# Patient Record
Sex: MALE | Race: WHITE | NOT HISPANIC OR LATINO | URBAN - METROPOLITAN AREA
[De-identification: names, ages, dates, MRNs, and addresses within clinical notes are randomized per-mention and may not be internally consistent; named-entity substitution may affect disease eponyms.]

---

## 2023-04-05 LAB
ALBUMIN (G/DL) IN SER/PLAS: 4.5 G/DL (ref 3.4–5)
ANION GAP IN SER/PLAS: 12 MMOL/L (ref 10–20)
CALCIUM (MG/DL) IN SER/PLAS: 10 MG/DL (ref 8.6–10.6)
CARBON DIOXIDE, TOTAL (MMOL/L) IN SER/PLAS: 31 MMOL/L (ref 21–32)
CHLORIDE (MMOL/L) IN SER/PLAS: 103 MMOL/L (ref 98–107)
CREATININE (MG/DL) IN SER/PLAS: 0.72 MG/DL (ref 0.5–1.3)
ERYTHROCYTE DISTRIBUTION WIDTH (RATIO) BY AUTOMATED COUNT: 13.4 % (ref 11.5–14.5)
ERYTHROCYTE MEAN CORPUSCULAR HEMOGLOBIN CONCENTRATION (G/DL) BY AUTOMATED: 34.1 G/DL (ref 32–36)
ERYTHROCYTE MEAN CORPUSCULAR VOLUME (FL) BY AUTOMATED COUNT: 86 FL (ref 80–100)
ERYTHROCYTES (10*6/UL) IN BLOOD BY AUTOMATED COUNT: 5.35 X10E12/L (ref 4.5–5.9)
GFR MALE: >90 ML/MIN/1.73M2
GLUCOSE (MG/DL) IN SER/PLAS: 88 MG/DL (ref 74–99)
HEMATOCRIT (%) IN BLOOD BY AUTOMATED COUNT: 46.1 % (ref 41–52)
HEMOGLOBIN (G/DL) IN BLOOD: 15.7 G/DL (ref 13.5–17.5)
LEUKOCYTES (10*3/UL) IN BLOOD BY AUTOMATED COUNT: 6.8 X10E9/L (ref 4.4–11.3)
NRBC (PER 100 WBCS) BY AUTOMATED COUNT: 0 /100 WBC (ref 0–0)
PHOSPHATE (MG/DL) IN SER/PLAS: 3.4 MG/DL (ref 2.5–4.9)
PLATELETS (10*3/UL) IN BLOOD AUTOMATED COUNT: 211 X10E9/L (ref 150–450)
POTASSIUM (MMOL/L) IN SER/PLAS: 4.7 MMOL/L (ref 3.5–5.3)
SODIUM (MMOL/L) IN SER/PLAS: 141 MMOL/L (ref 136–145)
UREA NITROGEN (MG/DL) IN SER/PLAS: 12 MG/DL (ref 6–23)

## 2023-09-15 ENCOUNTER — HOSPITAL ENCOUNTER (OUTPATIENT)
Dept: DATA CONVERSION | Facility: HOSPITAL | Age: 68
Discharge: HOME | End: 2023-09-15

## 2023-09-15 DIAGNOSIS — R13.10 DYSPHAGIA, UNSPECIFIED: ICD-10-CM

## 2023-09-15 DIAGNOSIS — G23.1: ICD-10-CM

## 2023-10-01 DIAGNOSIS — N52.9 ERECTILE DYSFUNCTION, UNSPECIFIED ERECTILE DYSFUNCTION TYPE: Primary | ICD-10-CM

## 2023-10-02 RX ORDER — SILDENAFIL 100 MG/1
TABLET, FILM COATED ORAL
Qty: 10 TABLET | Refills: 0 | Status: SHIPPED | OUTPATIENT
Start: 2023-10-02 | End: 2023-11-06

## 2023-11-01 ENCOUNTER — TELEPHONE (OUTPATIENT)
Dept: PRIMARY CARE | Facility: CLINIC | Age: 68
End: 2023-11-01

## 2023-11-06 DIAGNOSIS — N52.9 ERECTILE DYSFUNCTION, UNSPECIFIED ERECTILE DYSFUNCTION TYPE: ICD-10-CM

## 2023-11-06 RX ORDER — SILDENAFIL 100 MG/1
TABLET, FILM COATED ORAL
Qty: 10 TABLET | Refills: 0 | Status: SHIPPED | OUTPATIENT
Start: 2023-11-06 | End: 2023-12-18

## 2023-11-14 DIAGNOSIS — I10 ESSENTIAL HYPERTENSION: Primary | ICD-10-CM

## 2023-11-15 RX ORDER — METOPROLOL SUCCINATE 100 MG/1
100 TABLET, EXTENDED RELEASE ORAL 2 TIMES DAILY
Qty: 60 TABLET | Refills: 0 | Status: SHIPPED | OUTPATIENT
Start: 2023-11-15 | End: 2023-12-28

## 2023-12-13 DIAGNOSIS — N52.9 ERECTILE DYSFUNCTION, UNSPECIFIED ERECTILE DYSFUNCTION TYPE: ICD-10-CM

## 2023-12-18 RX ORDER — SILDENAFIL 100 MG/1
TABLET, FILM COATED ORAL
Qty: 10 TABLET | Refills: 0 | Status: SHIPPED | OUTPATIENT
Start: 2023-12-18

## 2023-12-22 DIAGNOSIS — I10 ESSENTIAL HYPERTENSION: ICD-10-CM

## 2023-12-28 DIAGNOSIS — I10 ESSENTIAL HYPERTENSION: ICD-10-CM

## 2023-12-28 RX ORDER — METOPROLOL SUCCINATE 100 MG/1
100 TABLET, EXTENDED RELEASE ORAL 2 TIMES DAILY
Qty: 60 TABLET | Refills: 0 | Status: SHIPPED | OUTPATIENT
Start: 2023-12-28 | End: 2023-12-29

## 2023-12-29 RX ORDER — METOPROLOL SUCCINATE 100 MG/1
100 TABLET, EXTENDED RELEASE ORAL 2 TIMES DAILY
Qty: 60 TABLET | Refills: 0 | Status: SHIPPED | OUTPATIENT
Start: 2023-12-29

## 2024-01-04 PROBLEM — K21.9 GERD (GASTROESOPHAGEAL REFLUX DISEASE): Status: ACTIVE | Noted: 2024-01-04

## 2024-01-04 PROBLEM — M48.00 SPINAL STENOSIS: Status: ACTIVE | Noted: 2024-01-04

## 2024-01-04 PROBLEM — N41.9 PROSTATITIS: Status: ACTIVE | Noted: 2024-01-04

## 2024-01-04 PROBLEM — R26.81 GAIT INSTABILITY: Status: ACTIVE | Noted: 2024-01-04

## 2024-01-04 PROBLEM — F33.9 RECURRENT MAJOR DEPRESSIVE DISORDER (CMS-HCC): Status: ACTIVE | Noted: 2024-01-04

## 2024-01-04 PROBLEM — R41.3 MEMORY LOSS: Status: ACTIVE | Noted: 2024-01-04

## 2024-01-04 PROBLEM — M54.50 PAIN, LOW BACK: Status: ACTIVE | Noted: 2024-01-04

## 2024-01-04 PROBLEM — R00.0 TACHYCARDIA: Status: ACTIVE | Noted: 2024-01-04

## 2024-01-04 PROBLEM — H05.20 PROPTOSIS: Status: ACTIVE | Noted: 2024-01-04

## 2024-01-04 PROBLEM — R05.9 COUGH: Status: ACTIVE | Noted: 2024-01-04

## 2024-01-04 PROBLEM — R60.9 EDEMA: Status: ACTIVE | Noted: 2024-01-04

## 2024-01-04 PROBLEM — I10 BENIGN ESSENTIAL HYPERTENSION: Status: ACTIVE | Noted: 2024-01-04

## 2024-01-04 PROBLEM — J02.9 ACUTE INFECTIVE PHARYNGITIS: Status: ACTIVE | Noted: 2024-01-04

## 2024-01-04 PROBLEM — G89.29 CHRONIC PAIN: Status: ACTIVE | Noted: 2024-01-04

## 2024-01-04 PROBLEM — G31.84 MCI (MILD COGNITIVE IMPAIRMENT): Status: ACTIVE | Noted: 2024-01-04

## 2024-01-04 PROBLEM — R31.9 HEMATURIA: Status: ACTIVE | Noted: 2024-01-04

## 2024-01-04 PROBLEM — N52.9 MALE ERECTILE DISORDER OF ORGANIC ORIGIN: Status: ACTIVE | Noted: 2024-01-04

## 2024-01-04 PROBLEM — R42 DIZZINESS: Status: ACTIVE | Noted: 2024-01-04

## 2024-01-04 PROBLEM — J18.9 PNEUMONIA: Status: ACTIVE | Noted: 2024-01-04

## 2024-01-04 PROBLEM — H53.2 DOUBLE VISION: Status: ACTIVE | Noted: 2024-01-04

## 2024-01-04 PROBLEM — D47.2 MONOCLONAL GAMMOPATHY: Status: ACTIVE | Noted: 2024-01-04

## 2024-01-04 RX ORDER — ASCORBIC ACID 500 MG
3 TABLET ORAL 2 TIMES DAILY
COMMUNITY
Start: 2017-06-22

## 2024-01-04 RX ORDER — NAPROXEN SODIUM 220 MG
1 TABLET ORAL 2 TIMES DAILY PRN
COMMUNITY
Start: 2017-06-22

## 2024-01-04 RX ORDER — RAMIPRIL 10 MG/1
20 CAPSULE ORAL DAILY
COMMUNITY

## 2024-01-04 RX ORDER — OXYCODONE AND ACETAMINOPHEN 7.5; 325 MG/1; MG/1
TABLET ORAL
COMMUNITY
Start: 2013-02-26

## 2024-01-04 RX ORDER — PREDNISONE 10 MG/1
TABLET ORAL
COMMUNITY
Start: 2023-06-29

## 2024-01-04 RX ORDER — AMLODIPINE BESYLATE 10 MG/1
10 TABLET ORAL DAILY
COMMUNITY
Start: 2023-08-30

## 2024-01-04 RX ORDER — AMLODIPINE BESYLATE 5 MG/1
1 TABLET ORAL DAILY
COMMUNITY
Start: 2012-06-15

## 2024-01-04 RX ORDER — OXYCODONE AND ACETAMINOPHEN 10; 325 MG/1; MG/1
TABLET ORAL
COMMUNITY
Start: 2023-11-17

## 2024-01-04 RX ORDER — LORAZEPAM 1 MG/1
TABLET ORAL
COMMUNITY
Start: 2023-07-07

## 2024-01-04 RX ORDER — METOPROLOL SUCCINATE 100 MG/1
1 TABLET, EXTENDED RELEASE ORAL 2 TIMES DAILY
COMMUNITY
Start: 2013-02-27

## 2024-01-04 RX ORDER — SILDENAFIL 100 MG/1
TABLET, FILM COATED ORAL
COMMUNITY
Start: 2015-02-23

## 2024-01-04 RX ORDER — GABAPENTIN 100 MG/1
CAPSULE ORAL
COMMUNITY

## 2024-01-04 RX ORDER — CHOLECALCIFEROL (VITAMIN D3) 25 MCG
1 TABLET ORAL DAILY
COMMUNITY
Start: 2017-06-22

## 2024-01-04 RX ORDER — DULOXETIN HYDROCHLORIDE 20 MG/1
CAPSULE, DELAYED RELEASE ORAL
COMMUNITY
Start: 2023-09-06 | End: 2024-01-10 | Stop reason: ALTCHOICE

## 2024-01-10 ENCOUNTER — OFFICE VISIT (OUTPATIENT)
Dept: NEUROLOGY | Facility: CLINIC | Age: 69
End: 2024-01-10
Payer: MEDICARE

## 2024-01-10 VITALS
HEART RATE: 98 BPM | WEIGHT: 169.5 LBS | RESPIRATION RATE: 18 BRPM | DIASTOLIC BLOOD PRESSURE: 99 MMHG | BODY MASS INDEX: 25.77 KG/M2 | TEMPERATURE: 97.8 F | SYSTOLIC BLOOD PRESSURE: 156 MMHG

## 2024-01-10 DIAGNOSIS — G31.84 MCI (MILD COGNITIVE IMPAIRMENT): ICD-10-CM

## 2024-01-10 DIAGNOSIS — G23.1 PROGRESSIVE SUPRANUCLEAR PALSY (MULTI): Primary | ICD-10-CM

## 2024-01-10 DIAGNOSIS — F32.A DEPRESSION, UNSPECIFIED DEPRESSION TYPE: ICD-10-CM

## 2024-01-10 PROCEDURE — 3077F SYST BP >= 140 MM HG: CPT | Performed by: PSYCHIATRY & NEUROLOGY

## 2024-01-10 PROCEDURE — 1126F AMNT PAIN NOTED NONE PRSNT: CPT | Performed by: PSYCHIATRY & NEUROLOGY

## 2024-01-10 PROCEDURE — 99214 OFFICE O/P EST MOD 30 MIN: CPT | Performed by: PSYCHIATRY & NEUROLOGY

## 2024-01-10 PROCEDURE — 3080F DIAST BP >= 90 MM HG: CPT | Performed by: PSYCHIATRY & NEUROLOGY

## 2024-01-10 PROCEDURE — 1159F MED LIST DOCD IN RCRD: CPT | Performed by: PSYCHIATRY & NEUROLOGY

## 2024-01-10 PROCEDURE — 1036F TOBACCO NON-USER: CPT | Performed by: PSYCHIATRY & NEUROLOGY

## 2024-01-10 RX ORDER — SERTRALINE HYDROCHLORIDE 50 MG/1
TABLET, FILM COATED ORAL
Qty: 90 TABLET | Refills: 3 | Status: SHIPPED | OUTPATIENT
Start: 2024-01-10

## 2024-01-10 ASSESSMENT — PATIENT HEALTH QUESTIONNAIRE - PHQ9
SUM OF ALL RESPONSES TO PHQ9 QUESTIONS 1 AND 2: 0
1. LITTLE INTEREST OR PLEASURE IN DOING THINGS: NOT AT ALL
2. FEELING DOWN, DEPRESSED OR HOPELESS: NOT AT ALL

## 2024-01-10 ASSESSMENT — ENCOUNTER SYMPTOMS
LOSS OF SENSATION IN FEET: 0
DEPRESSION: 0
OCCASIONAL FEELINGS OF UNSTEADINESS: 1

## 2024-01-10 ASSESSMENT — PAIN SCALES - GENERAL: PAINLEVEL: 0-NO PAIN

## 2024-01-10 NOTE — PROGRESS NOTES
Start Time:4:15pm  Stop Time:4:45pm    Chart reviewed, including physician notes and diagnostic studies, for 3 minutes prior to this appointment.      Accompanied by: wife    Following the patient's last visit, they were to get a swallowing evaluation, start duloxetine, and discuss possible prisms with optometrist.  They were contacted for participation in the ALLFTD study, but its been difficult finding a date.  Mood is depressed.  No passive death wish or suicidal ideation.  Sleeping well.  Appetite is good.  He has more speech apraxia.  He has fallen three times since I last saw him.  He falls backwards in a plane and he has not had injuries.  His wife notices more confusion and perseveration.  He would like to discontinue duloxetine as it makes him tired.    Allergies   Allergen Reactions    Nsaids (Non-Steroidal Anti-Inflammatory Drug) Unknown    Pentazocine Unknown         Current Outpatient Medications:     amLODIPine (Norvasc) 10 mg tablet, Take 1 tablet (10 mg) by mouth once daily., Disp: , Rfl:     ascorbic acid (Vitamin C) 500 mg tablet, Take 3 tablets (1,500 mg) by mouth 2 times a day., Disp: , Rfl:     cholecalciferol (Vitamin D-3) 25 MCG (1000 UT) tablet, Take 1 tablet (25 mcg) by mouth once daily., Disp: , Rfl:     gabapentin (Neurontin) 100 mg capsule, TAKE 1 TO 2 CAPSULES BY MOUTH FOUR TIMES A DAY, Disp: , Rfl:     LORazepam (Ativan) 1 mg tablet, TAKE 1 TABLET BY MOUTH 15 minutes before MRI, Disp: , Rfl:     metoprolol succinate XL (Toprol-XL) 100 mg 24 hr tablet, TAKE 1 TABLET BY MOUTH 2 TIMES A DAY, Disp: 60 tablet, Rfl: 0    oxyCODONE-acetaminophen (Percocet)  mg tablet, TAKE 1 TABLET BY MOUTH 5 TIMES A DAY AS NEEDED, Disp: , Rfl:     predniSONE (Deltasone) 10 mg tablet, TAKE 4 TABLETS BY MOUTH DAILY FOR 2 DAYS, THEN 3 TABLETS DAILY FOR 2 DAYS, THEN 2 TABLETS DAILY FOR 2 DAYS, THEN STOP, Disp: , Rfl:     ramipril (Altace) 10 mg capsule, Take 2 capsules (20 mg) by mouth once daily., Disp: ,  "Rfl:     sildenafil (Viagra) 100 mg tablet, TAKE 1 TABLET BY MOUTH PER DAY 1 HOUR BEFORE NEEDED, Disp: 10 tablet, Rfl: 0    sildenafil (Viagra) 100 mg tablet, Take by mouth., Disp: , Rfl:     amLODIPine (Norvasc) 5 mg tablet, Take 1 tablet (5 mg) by mouth once daily., Disp: , Rfl:     metoprolol succinate XL (Toprol-XL) 100 mg 24 hr tablet, Take 1 tablet (100 mg) by mouth 2 times a day., Disp: , Rfl:     naproxen sodium (Aleve) 220 mg tablet, Take 1 tablet (220 mg) by mouth 2 times a day as needed., Disp: , Rfl:     oxyCODONE-acetaminophen (Percocet) 7.5-325 mg tablet, Take by mouth., Disp: , Rfl:     sertraline (Zoloft) 50 mg tablet, Take 1/2 tab PO daily for two weeks, then increase to one pill by mouth daily, Disp: 90 tablet, Rfl: 3    Review of Systems  As per HPI, otherwise all other systems have been reviewed are negative for complaint.      Objective   BP (!) 156/99   Pulse 98   Temp 36.6 °C (97.8 °F) (Tympanic)   Resp 18   Wt 76.9 kg (169 lb 8 oz)   BMI 25.77 kg/m²     EXAMINATION  Mental Status Examination  General appearance: well kept, good eye contact, cooperative  Orientation: alert and oriented to place and person  Motor: significant, gait is parkinsonian  Speech:  somewhat apraxic  Mood: depressed  Affect: Constricted and depressed  Passive death wish: No  Suicidal ideation: No  Thought process:  bradyphrenic  Thought content: Hallucinations:no, Delusions: none, denied; and not responding to internal stimuli  Insight/Judgment: Fair/Fair  Fund of knowledge: Good  Recent and remote memory: Fair/Good  Attention span and concentration: Poor  Language: apraxia of speech    Reg: 3/3    Season: fall x  Year: ?    President: ?    State: [place]  City: [place]  Place:   Floor: [facility]  Street: [facility]    Recall: 2/3 (3/3 with cues)    MoCA (3/01/2023): 16/30  \"f\"=1 word  MIS: 5/15     MoCA (6/22/2017): 19+1 = 20/30 (- 2 visuospatial, - 2 subtraction, - 1 fluency, -1 abstraction, - 5 recall)  Phonemic: 10 " animals: 16  recall: 0/5 --> cc 3/5, mc 2/2  MIS 8/15      NEUROLOGICAL EXAMINATION  Slowed horizontal saccades and restricted vertical gaze    Motor:  Muscular tone: abnormal bilateral cogwheel rigidity primarily in bilateral wrists and axial rigidity  Movements: normal    Gait:  Able to stand from seated position with arms across chest: abnormal requires assistance  parkinsonian    Negative applause sign    Formulation: Mr. Lopez is a very-pleasant 68 y.o. year old, ,  male with a past personal history of progressive supranuclear palsy and depression who is presenting today for a follow-up visit.  He is significantly more depressed and bradyphrenic.    Diagnosis:  Progressive supranuclear palsy  Depression, not otherwise specified, moderate severity      Plan:  - if you are on two pills of Cymbalta, go down to one pill for two weeks, then stop it  - start sertraline (Zoloft) 50mg, take 1/2 pill by mouth daily for two weeks, then increase to one pill by mouth daily  - if mood no better when you are on the whole pill for one month, then call me and we will likely increase it ([phone])  - continue other medications  - follow-up with me in about 3 months with repeat MoCA

## 2024-01-10 NOTE — PATIENT INSTRUCTIONS
- if you are on two pills of Cymbalta, go down to one pill for two weeks, then stop it  - start sertraline (Zoloft) 50mg, take 1/2 pill by mouth daily for two weeks, then increase to one pill by mouth daily  - if mood no better when you are on the whole pill for one month, then call me and we will likely increase it ([phone])  - continue other medications  - follow-up with me in about 3 months with repeat MoCA

## 2024-01-17 ENCOUNTER — OFFICE VISIT (OUTPATIENT)
Dept: PRIMARY CARE | Facility: CLINIC | Age: 69
End: 2024-01-17
Payer: MEDICARE

## 2024-01-17 VITALS
OXYGEN SATURATION: 95 % | WEIGHT: 169.53 LBS | HEART RATE: 97 BPM | DIASTOLIC BLOOD PRESSURE: 90 MMHG | BODY MASS INDEX: 25.69 KG/M2 | RESPIRATION RATE: 16 BRPM | SYSTOLIC BLOOD PRESSURE: 150 MMHG | HEIGHT: 68 IN

## 2024-01-17 DIAGNOSIS — R09.82 POST-NASAL DRIP: Primary | ICD-10-CM

## 2024-01-17 PROCEDURE — 3077F SYST BP >= 140 MM HG: CPT | Performed by: INTERNAL MEDICINE

## 2024-01-17 PROCEDURE — 1126F AMNT PAIN NOTED NONE PRSNT: CPT | Performed by: INTERNAL MEDICINE

## 2024-01-17 PROCEDURE — 1159F MED LIST DOCD IN RCRD: CPT | Performed by: INTERNAL MEDICINE

## 2024-01-17 PROCEDURE — 1036F TOBACCO NON-USER: CPT | Performed by: INTERNAL MEDICINE

## 2024-01-17 PROCEDURE — 3080F DIAST BP >= 90 MM HG: CPT | Performed by: INTERNAL MEDICINE

## 2024-01-17 PROCEDURE — 99213 OFFICE O/P EST LOW 20 MIN: CPT | Performed by: INTERNAL MEDICINE

## 2024-01-17 NOTE — PROGRESS NOTES
"68-year-old male with progressive supranuclear palsy depression followed by neurology here for evaluation of cough.  For the past week he has had a cough, it only comes when he lays flat, no cough during the day.  No chest pain shortness of breath.  No fevers no chills.  He does have chronic allergies but no sinus pain no sore throat.  No headache.  Unfortunately he is supranuclear palsy is progressing, his gait is getting worse, his memory is getting worse, his speech is getting worse.  He is looking into various studies.  Lives at home with wife.    Appetite intact.  No nausea vomit diarrhea.  No weight changes.    Blood pressure 150/90, pulse 97, resp. rate 16, height 1.727 m (5' 8\"), weight 76.9 kg (169 lb 8.5 oz), SpO2 95 %.   Repeat blood pressure is 142/80.  Calm coherent and appropriate    Neck is supple and none tender    Breathing comfortably, clear to auscultation bilaterally    Regular rate rhythm, no murmur gallops or rubs.  Good distal pulses.  No edema.  No JVD.    Abdomen soft and nontender, normal bowel sounds.    Extremities warm well perfused with no clubbing or cyanosis    Cognition intact.    Assessment plan: Karl is a pleasant 60-year-old male with progressive supranuclear palsy depression here for relation of cough consistent with postnasal drip, trial of Flonase twice a day for 5 days.  Call if symptoms not resolved by next week.    Please see my last note for details of other medical issues.  "

## 2024-01-24 DIAGNOSIS — N52.9 ERECTILE DYSFUNCTION, UNSPECIFIED ERECTILE DYSFUNCTION TYPE: Primary | ICD-10-CM

## 2024-01-26 RX ORDER — SILDENAFIL 100 MG/1
100 TABLET, FILM COATED ORAL ONCE AS NEEDED
Qty: 30 TABLET | Refills: 2 | Status: SHIPPED | OUTPATIENT
Start: 2024-01-26

## 2024-03-01 ENCOUNTER — TELEPHONE (OUTPATIENT)
Dept: PRIMARY CARE | Facility: CLINIC | Age: 69
End: 2024-03-01
Payer: MEDICARE

## 2024-03-01 DIAGNOSIS — R26.81 GAIT INSTABILITY: Primary | ICD-10-CM

## 2024-06-19 ENCOUNTER — APPOINTMENT (OUTPATIENT)
Dept: NEUROLOGY | Facility: CLINIC | Age: 69
End: 2024-06-19
Payer: MEDICARE

## 2024-06-19 ENCOUNTER — CLINICAL SUPPORT (OUTPATIENT)
Dept: GERIATRIC MEDICINE | Facility: CLINIC | Age: 69
End: 2024-06-19
Payer: MEDICARE

## 2024-06-19 ENCOUNTER — OFFICE VISIT (OUTPATIENT)
Dept: NEUROLOGY | Facility: CLINIC | Age: 69
End: 2024-06-19
Payer: MEDICARE

## 2024-06-19 VITALS
TEMPERATURE: 97.8 F | HEART RATE: 98 BPM | DIASTOLIC BLOOD PRESSURE: 99 MMHG | RESPIRATION RATE: 18 BRPM | SYSTOLIC BLOOD PRESSURE: 168 MMHG

## 2024-06-19 DIAGNOSIS — G23.1 PROGRESSIVE SUPRANUCLEAR PALSY (MULTI): Primary | ICD-10-CM

## 2024-06-19 DIAGNOSIS — F32.A DEPRESSION, UNSPECIFIED DEPRESSION TYPE: ICD-10-CM

## 2024-06-19 DIAGNOSIS — G31.84 MCI (MILD COGNITIVE IMPAIRMENT): ICD-10-CM

## 2024-06-19 PROCEDURE — 3080F DIAST BP >= 90 MM HG: CPT | Performed by: PSYCHIATRY & NEUROLOGY

## 2024-06-19 PROCEDURE — 99214 OFFICE O/P EST MOD 30 MIN: CPT | Performed by: PSYCHIATRY & NEUROLOGY

## 2024-06-19 PROCEDURE — 3077F SYST BP >= 140 MM HG: CPT | Performed by: PSYCHIATRY & NEUROLOGY

## 2024-06-19 RX ORDER — SERTRALINE HYDROCHLORIDE 50 MG/1
75 TABLET, FILM COATED ORAL DAILY
Qty: 135 TABLET | Refills: 3 | Status: SHIPPED | OUTPATIENT
Start: 2024-06-19 | End: 2025-06-19

## 2024-06-19 ASSESSMENT — MONTREAL COGNITIVE ASSESSMENT (MOCA)
12. MEMORY INDEX SCORE: 1
4. NAME EACH OF THE THREE ANIMALS SHOWN: 0
VISUOSPATIAL/EXECUTIVE SUBSCORE: 0
WHAT IS THE TOTAL SCORE (OUT OF 30): 10
5. MEMORY TRIALS: 0
11. FOR EACH PAIR OF WORDS, WHAT CATEGORY DO THEY BELONG TO (OUT OF 2): 1
WHAT LEVEL OF EDUCATION WAS ATTAINED: 1
10. [FLUENCY] NAME WORDS STARTING WITH DESIGNATED LETTER: 0
6. READ LIST OF DIGITS [FORWARD/BACKWARD]: 2
8. SERIAL SUBTRACTION OF 7S: 1
7. [VIGILENCE] TAP WHEN HEARING DESIGNATED LETTER: 0
9. REPEAT EACH SENTENCE: 1
13. ORIENTATION SUBSCORE: 3

## 2024-06-19 NOTE — PATIENT INSTRUCTIONS
- take sertraline (Zoloft) 50mg, take 1.5 pills daily for two weeks, if mood no better, then increase to two pills.  If no better two weeks after that, let me know and we will increase it again  - once your mood is better, we will try a trial of Sinemet if you are still interested for the walking  - continue other medications  - have wife administer your medications and resume blood pressure medications  - follow-up with me in about 6 months    General brain health guidelines:  - make sure your other medical conditions are well controlled (e.g., high blood pressure, high cholesterol, diabetes, sleep apnea etc)  - do not smoke or chew tobacco  - address any sensory deficits (e.g., proper glasses for poor eyesight, hearing aids for hearing loss)  - use a weekly pill box  - eat a heart healthy diet (e.g., lots of fruits and vegetables, low fat and cholesterol)  - exercise at least four days per week, 30 minutes at a time at an intensity that would make it difficult to converse with someone   - make sure you are getting at least 7 hours of quality sleep per night  - keep yourself mentally active daily by reading, playing cards, doing word searches, puzzles, etc.  - stay socially active by being part of a group or organization    Please note that the above may not be an entirely accurate or complete list of your medications, allergies, past medical history, past surgical history, or active problems as the document is completed following your visit.

## 2024-06-19 NOTE — PROGRESS NOTES
Start Time:2:50pm  Stop Time:3:15pm    Chart reviewed, including physician notes and diagnostic studies, for 2 minutes prior to this appointment.      Accompanied by: wife    Formulation: Mr. Lopez is a very-pleasant 69 y.o. year old, ,  male with a past personal history of progressive supranuclear palsy and depression who is presenting today for a follow-up visit.  His mood is still depressed and he is having more oculomotor and visual problems.    Diagnosis:  Progressive supranuclear palsy  Depression, not otherwise specified, moderate severity    Plan:  - take sertraline (Zoloft) 50mg, take 1.5 pills daily for two weeks, if mood no better, then increase to two pills.  If no better two weeks after that, let me know and we will increase it again  - once your mood is better, we will try a trial of Sinemet if you are still interested for the walking  - continue other medications  - have wife administer your medications and resume blood pressure medications  - follow-up with me in about 6 months  ------------------------------------------------------------------------------------------------------------------------------    History of Present Illness:  I last saw the patient in January 2024, at which time they were to cross-taper duloxetine for sertraline.  He has not been taking his blood pressure meds for unknown reasons.  His wife will now administer his meds.  He states his eyesight is worse.  He feels like his focusing is not as good.  He sees double sometimes.  He complains of dizziness and lightheadedness.  No falls.  Mood is not good.  His self-attitude is diminished.  His sleep is fair.  He's not eating as much.  He has some delusional presence but no hallucination.      Allergies   Allergen Reactions    Nsaids (Non-Steroidal Anti-Inflammatory Drug) Unknown    Pentazocine Unknown         Current Outpatient Medications:     amLODIPine (Norvasc) 10 mg tablet, Take 1 tablet (10 mg) by mouth  once daily., Disp: , Rfl:     amLODIPine (Norvasc) 5 mg tablet, Take 1 tablet (5 mg) by mouth once daily., Disp: , Rfl:     ascorbic acid (Vitamin C) 500 mg tablet, Take 3 tablets (1,500 mg) by mouth 2 times a day., Disp: , Rfl:     cholecalciferol (Vitamin D-3) 25 MCG (1000 UT) tablet, Take 1 tablet (25 mcg) by mouth once daily., Disp: , Rfl:     gabapentin (Neurontin) 100 mg capsule, TAKE 1 TO 2 CAPSULES BY MOUTH FOUR TIMES A DAY, Disp: , Rfl:     LORazepam (Ativan) 1 mg tablet, TAKE 1 TABLET BY MOUTH 15 minutes before MRI, Disp: , Rfl:     metoprolol succinate XL (Toprol-XL) 100 mg 24 hr tablet, TAKE 1 TABLET BY MOUTH 2 TIMES A DAY, Disp: 60 tablet, Rfl: 0    metoprolol succinate XL (Toprol-XL) 100 mg 24 hr tablet, Take 1 tablet (100 mg) by mouth 2 times a day., Disp: , Rfl:     naproxen sodium (Aleve) 220 mg tablet, Take 1 tablet (220 mg) by mouth 2 times a day as needed., Disp: , Rfl:     oxyCODONE-acetaminophen (Percocet)  mg tablet, TAKE 1 TABLET BY MOUTH 5 TIMES A DAY AS NEEDED, Disp: , Rfl:     oxyCODONE-acetaminophen (Percocet) 7.5-325 mg tablet, Take by mouth., Disp: , Rfl:     predniSONE (Deltasone) 10 mg tablet, TAKE 4 TABLETS BY MOUTH DAILY FOR 2 DAYS, THEN 3 TABLETS DAILY FOR 2 DAYS, THEN 2 TABLETS DAILY FOR 2 DAYS, THEN STOP, Disp: , Rfl:     ramipril (Altace) 10 mg capsule, Take 2 capsules (20 mg) by mouth once daily., Disp: , Rfl:     sertraline (Zoloft) 50 mg tablet, Take 1.5 tablets (75 mg) by mouth once daily., Disp: 135 tablet, Rfl: 3    sildenafil (Viagra) 100 mg tablet, TAKE 1 TABLET BY MOUTH PER DAY 1 HOUR BEFORE NEEDED, Disp: 10 tablet, Rfl: 0    sildenafil (Viagra) 100 mg tablet, Take by mouth., Disp: , Rfl:     sildenafil (Viagra) 100 mg tablet, TAKE 1 TABLET BY MOUTH EVERY DAY 1 HOUR BEFORE NEEDED, Disp: 30 tablet, Rfl: 2    Review of Systems  As per HPI, otherwise all other systems have been reviewed are negative for complaint.      Objective   BP (!) 168/99 (BP Location: Right  "arm, Patient Position: Sitting, BP Cuff Size: Adult)   Pulse 98   Temp 36.6 °C (97.8 °F) (Tympanic)   Resp 18 Comment: POX 95% RA    EXAMINATION  Mental Status Examination  General appearance: well kept, good eye contact, cooperative  Orientation: alert and oriented to place and person  Motor: significant, gait is parkinsonian and uses a rollator  Speech:  somewhat apraxic  Mood: depressed  Affect: Constricted and depressed  Passive death wish: No  Suicidal ideation: No  Thought process:  bradyphrenic  Thought content: Hallucinations:no, Delusions: none, denied; and not responding to internal stimuli  Insight/Judgment: Fair/Fair  Fund of knowledge: Good  Recent and remote memory: Fair/Good  Attention span and concentration: Poor  Language: apraxia of speech    Blind MoCA (6/19/2024): 10/22  \"F\"=1 word  MIS: 7/15    MoCA (3/01/2023): 16/30  \"f\"=1 word  MIS: 5/15     MoCA (6/22/2017): 19+1 = 20/30 (- 2 visuospatial, - 2 subtraction, - 1 fluency, -1 abstraction, - 5 recall)  Phonemic: 10 animals: 16  recall: 0/5 --> cc 3/5, mc 2/2  MIS 8/15        BRIEF NEUROLOGICAL EXAMINATION  Slowed horizontal saccades and restricted vertical gaze and partially restricted horizontal gaze     Motor:  Muscular tone: abnormal bilateral cogwheel rigidity primarily in bilateral wrists and axial rigidity  Movements: normal     Gait:  Able to stand from seated position with arms across chest: abnormal requires assistance  Parkinsonian with use of walker  "

## 2024-06-20 DIAGNOSIS — I10 BENIGN ESSENTIAL HYPERTENSION: Primary | ICD-10-CM

## 2024-06-20 RX ORDER — RAMIPRIL 10 MG/1
20 CAPSULE ORAL DAILY
Qty: 60 CAPSULE | Refills: 0 | Status: SHIPPED | OUTPATIENT
Start: 2024-06-20 | End: 2024-07-20

## 2024-07-02 ENCOUNTER — HOSPITAL ENCOUNTER (EMERGENCY)
Facility: HOSPITAL | Age: 69
Discharge: HOME | End: 2024-07-02
Attending: GENERAL PRACTICE
Payer: MEDICARE

## 2024-07-02 ENCOUNTER — APPOINTMENT (OUTPATIENT)
Dept: RADIOLOGY | Facility: HOSPITAL | Age: 69
End: 2024-07-02
Payer: MEDICARE

## 2024-07-02 VITALS
WEIGHT: 180 LBS | BODY MASS INDEX: 25.77 KG/M2 | OXYGEN SATURATION: 96 % | TEMPERATURE: 99.3 F | HEART RATE: 65 BPM | SYSTOLIC BLOOD PRESSURE: 149 MMHG | HEIGHT: 70 IN | DIASTOLIC BLOOD PRESSURE: 80 MMHG | RESPIRATION RATE: 17 BRPM

## 2024-07-02 DIAGNOSIS — R07.9 CHEST PAIN, UNSPECIFIED TYPE: Primary | ICD-10-CM

## 2024-07-02 LAB
ALBUMIN SERPL BCP-MCNC: 4.4 G/DL (ref 3.4–5)
ALP SERPL-CCNC: 73 U/L (ref 33–136)
ALT SERPL W P-5'-P-CCNC: 14 U/L (ref 10–52)
ANION GAP SERPL CALC-SCNC: 12 MMOL/L (ref 10–20)
AST SERPL W P-5'-P-CCNC: 21 U/L (ref 9–39)
BASOPHILS # BLD AUTO: 0.04 X10*3/UL (ref 0–0.1)
BASOPHILS NFR BLD AUTO: 0.5 %
BILIRUB SERPL-MCNC: 0.8 MG/DL (ref 0–1.2)
BNP SERPL-MCNC: 192 PG/ML (ref 0–99)
BUN SERPL-MCNC: 13 MG/DL (ref 6–23)
CALCIUM SERPL-MCNC: 9.6 MG/DL (ref 8.6–10.3)
CARDIAC TROPONIN I PNL SERPL HS: 5 NG/L (ref 0–20)
CARDIAC TROPONIN I PNL SERPL HS: 6 NG/L (ref 0–20)
CHLORIDE SERPL-SCNC: 105 MMOL/L (ref 98–107)
CO2 SERPL-SCNC: 27 MMOL/L (ref 21–32)
CREAT SERPL-MCNC: 0.73 MG/DL (ref 0.5–1.3)
EGFRCR SERPLBLD CKD-EPI 2021: >90 ML/MIN/1.73M*2
EOSINOPHIL # BLD AUTO: 0.09 X10*3/UL (ref 0–0.7)
EOSINOPHIL NFR BLD AUTO: 1.1 %
ERYTHROCYTE [DISTWIDTH] IN BLOOD BY AUTOMATED COUNT: 13.3 % (ref 11.5–14.5)
GLUCOSE SERPL-MCNC: 97 MG/DL (ref 74–99)
HCT VFR BLD AUTO: 47.2 % (ref 41–52)
HGB BLD-MCNC: 16.2 G/DL (ref 13.5–17.5)
IMM GRANULOCYTES # BLD AUTO: 0.03 X10*3/UL (ref 0–0.7)
IMM GRANULOCYTES NFR BLD AUTO: 0.4 % (ref 0–0.9)
LYMPHOCYTES # BLD AUTO: 1.07 X10*3/UL (ref 1.2–4.8)
LYMPHOCYTES NFR BLD AUTO: 13 %
MCH RBC QN AUTO: 29.7 PG (ref 26–34)
MCHC RBC AUTO-ENTMCNC: 34.3 G/DL (ref 32–36)
MCV RBC AUTO: 87 FL (ref 80–100)
MONOCYTES # BLD AUTO: 0.64 X10*3/UL (ref 0.1–1)
MONOCYTES NFR BLD AUTO: 7.8 %
NEUTROPHILS # BLD AUTO: 6.37 X10*3/UL (ref 1.2–7.7)
NEUTROPHILS NFR BLD AUTO: 77.2 %
NRBC BLD-RTO: 0 /100 WBCS (ref 0–0)
PLATELET # BLD AUTO: 209 X10*3/UL (ref 150–450)
POTASSIUM SERPL-SCNC: 3.8 MMOL/L (ref 3.5–5.3)
PROT SERPL-MCNC: 7.1 G/DL (ref 6.4–8.2)
RBC # BLD AUTO: 5.45 X10*6/UL (ref 4.5–5.9)
SODIUM SERPL-SCNC: 140 MMOL/L (ref 136–145)
WBC # BLD AUTO: 8.2 X10*3/UL (ref 4.4–11.3)

## 2024-07-02 PROCEDURE — 83880 ASSAY OF NATRIURETIC PEPTIDE: CPT | Performed by: GENERAL PRACTICE

## 2024-07-02 PROCEDURE — 84484 ASSAY OF TROPONIN QUANT: CPT | Performed by: GENERAL PRACTICE

## 2024-07-02 PROCEDURE — 84075 ASSAY ALKALINE PHOSPHATASE: CPT | Performed by: GENERAL PRACTICE

## 2024-07-02 PROCEDURE — 71045 X-RAY EXAM CHEST 1 VIEW: CPT

## 2024-07-02 PROCEDURE — 36415 COLL VENOUS BLD VENIPUNCTURE: CPT | Performed by: GENERAL PRACTICE

## 2024-07-02 PROCEDURE — 85025 COMPLETE CBC W/AUTO DIFF WBC: CPT | Performed by: GENERAL PRACTICE

## 2024-07-02 PROCEDURE — 71045 X-RAY EXAM CHEST 1 VIEW: CPT | Performed by: RADIOLOGY

## 2024-07-02 PROCEDURE — 99283 EMERGENCY DEPT VISIT LOW MDM: CPT | Mod: 25

## 2024-07-02 ASSESSMENT — PAIN SCALES - GENERAL
PAINLEVEL_OUTOF10: 8
PAINLEVEL_OUTOF10: 8

## 2024-07-02 ASSESSMENT — PAIN DESCRIPTION - LOCATION: LOCATION: CHEST

## 2024-07-02 ASSESSMENT — PAIN - FUNCTIONAL ASSESSMENT: PAIN_FUNCTIONAL_ASSESSMENT: 0-10

## 2024-07-02 ASSESSMENT — COLUMBIA-SUICIDE SEVERITY RATING SCALE - C-SSRS
2. HAVE YOU ACTUALLY HAD ANY THOUGHTS OF KILLING YOURSELF?: NO
6. HAVE YOU EVER DONE ANYTHING, STARTED TO DO ANYTHING, OR PREPARED TO DO ANYTHING TO END YOUR LIFE?: NO
1. IN THE PAST MONTH, HAVE YOU WISHED YOU WERE DEAD OR WISHED YOU COULD GO TO SLEEP AND NOT WAKE UP?: NO

## 2024-07-02 ASSESSMENT — PAIN DESCRIPTION - DESCRIPTORS
DESCRIPTORS: SHARP
DESCRIPTORS: ACHING

## 2024-07-02 ASSESSMENT — PAIN DESCRIPTION - ORIENTATION: ORIENTATION: MID

## 2024-07-02 NOTE — ED PROVIDER NOTES
HPI   Chief Complaint   Patient presents with    Chest Pain       HPI: 69-year-old male with a history of dementia and chronic spinal stenosis presents with chest pain.  This morning he had a 2 to 3-hour episode of sharp chest pain that had no provocative or palliative factors.  He reports nausea and diaphoresis during this episode.  He denies shortness of breath, leg swelling and history of DVT/PE.  His symptoms resolved spontaneously and he is currently pain-free.  The pain was nonradiating      Limitations to history: None  Independent Historians: Patient  External Records Reviewed: HIE, outpatient notes, inpatient notes  ------------------------------------------------------------------------------------------------------------------------------------------  ROS: a ten point review of systems was performed and was negative except as per HPI.  ------------------------------------------------------------------------------------------------------------------------------------------  PMH / PSH: as per HPI, otherwise reviewed in EMR  MEDS: as per HPI, otherwise reviewed in EMR  ALLERGIES: as per HPI, otherwise reviewed in EMR  SocH:  as per HPI, otherwise reviewed in EMR  FH:  as per HPI, otherwise reviewed in EMR  ------------------------------------------------------------------------------------------------------------------------------------------  Physical Exam:  VS: As documented in the triage note and EMR flowsheet from this visit was reviewed  General: Well appearing. No acute distress.   Eyes:  Extraocular movements grossly intact. No scleral icterus. No discharge  HEENT:  Normocephalic.  Atraumatic  Neck: Moves neck freely. No gross masses  CV: Regular rhythm. No murmurs, rubs or gallops   Resp: Clear to auscultation bilaterally. No respiratory distress.    GI: Soft, no masses, nontender. No rebound tenderness or guarding  MSK: Symmetric muscle bulk. No deformities. No lower extremity edema.    Skin: Warm,  dry, intact.   Neuro: No focal deficits.  A&O x3.   Psych: Appropriate for situation  ------------------------------------------------------------------------------------------------------------------------------------------  Hospital Course / Medical Decision Making:  Independent Interpretations: Chest x-ray  EKG as interpreted by me: Normal sinus rhythm at 68 bpm with a normal axis, no bundle branch block and no signs of acute ischemia    MDM: 69-year-old male with a history of dementia and chronic spinal stenosis presents with chest pain.  He is pain-free in the ED and denies shortness of breath.  He was observed in the ED for over 5 hours with no recurrence of his symptoms.  He is well-appearing.  Lungs are clear to auscultation.  No peripheral edema.  Troponin nonelevated.  Chest x-ray shows no acute cardiopulmonary process.  EKG is nonischemic.  I conducted shared decision making with the patient and his wife and they do feel safe going home.  They will follow-up with their primary care physician and will return to the ED for any concerns.  I did order the patient an outpatient stress test.    Discussion of Management with Other Providers:   I discussed the patient/results with: Emergency medicine team    Final diagnosis and disposition as below.    Labs Reviewed  CBC WITH AUTO DIFFERENTIAL - Abnormal     WBC                           8.2                    nRBC                          0.0                    RBC                           5.45                   Hemoglobin                    16.2                   Hematocrit                    47.2                   MCV                           87                     MCH                           29.7                   MCHC                          34.3                   RDW                           13.3                   Platelets                     209                    Neutrophils %                 77.2                   Immature Granulocytes %,  Automated   0.4                    Lymphocytes %                 13.0                   Monocytes %                   7.8                    Eosinophils %                 1.1                    Basophils %                   0.5                    Neutrophils Absolute          6.37                   Immature Granulocytes Absolute, Au*   0.03                   Lymphocytes Absolute          1.07 (*)               Monocytes Absolute            0.64                   Eosinophils Absolute          0.09                   Basophils Absolute            0.04                B-TYPE NATRIURETIC PEPTIDE - Abnormal     BNP                           192 (*)                    Narrative:    <100 pg/mL - Heart failure unlikely                  100-299 pg/mL - Intermediate probability of acute heart                                  failure exacerbation. Correlate with clinical                                  context and patient history.                    >=300 pg/mL - Heart Failure likely. Correlate with clinical                                  context and patient history.                                    BNP testing is performed using different testing methodology at [facility] than at other [facility]. Direct result comparisons should only be made within the same method.                     COMPREHENSIVE METABOLIC PANEL - Normal     Glucose                       97                     Sodium                        140                    Potassium                     3.8                    Chloride                      105                    Bicarbonate                   27                     Anion Gap                     12                     Urea Nitrogen                 13                     Creatinine                    0.73                   eGFR                          >90                    Calcium                       9.6                    Albumin                       4.4                     Alkaline Phosphatase          73                     Total Protein                 7.1                    AST                           21                     Bilirubin, Total              0.8                    ALT                           14                  TROPONIN I, HIGH SENSITIVITY - Normal     Troponin I, High Sensitivity   5                          Narrative: Less than 99th percentile of normal range cutoff-                  Female and children under 18 years old <14 ng/L; Male <21 ng/L: Negative                  Repeat testing should be performed if clinically indicated.                                     Female and children under 18 years old 14-50 ng/L; Male 21-50 ng/L:                  Consistent with possible cardiac damage and possible increased clinical                   risk. Serial measurements may help to assess extent of myocardial damage.                                     >50 ng/L: Consistent with cardiac damage, increased clinical risk and                  myocardial infarction. Serial measurements may help assess extent of                   myocardial damage.                                      NOTE: Children less than 1 year old may have higher baseline troponin                   levels and results should be interpreted in conjunction with the overall                   clinical context.                                     NOTE: Troponin I testing is performed using a different                   testing methodology at [facility] than at other                   [facility]. Direct result comparisons should only                   be made within the same method.  TROPONIN I, HIGH SENSITIVITY - Normal    XR chest 1 view   Final Result    No focal infiltrate or pneumothorax is identified.          MACRO:    None.          Signed by: Blanco Lamas 7/2/2024 3:27 PM    Dictation workstation:   [id number]                                   Graham Coma Scale Score: 15                      Patient History   No past medical history on file.  No past surgical history on file.  No family history on file.  Social History     Tobacco Use    Smoking status: Former     Types: Cigarettes    Smokeless tobacco: Never   Substance Use Topics    Alcohol use: Not on file    Drug use: Not on file       Physical Exam   ED Triage Vitals [07/02/24 1429]   Temperature Heart Rate Respirations BP   37.4 °C (99.3 °F) 65 17 129/72      Pulse Ox Temp Source Heart Rate Source Patient Position   95 % Oral -- Sitting      BP Location FiO2 (%)     Left arm 21 %       Physical Exam    ED Course & MDM   Diagnoses as of 07/07/24 1654   Chest pain, unspecified type       Medical Decision Making      Procedure  Procedures     Salvador Shaikh, DO  07/07/24 1658

## 2024-07-02 NOTE — PROGRESS NOTES
Pharmacy Medication History Review    Karl Lopez is a 69 y.o. male admitted for No Principal Problem: There is no principal problem currently on the Problem List. Please update the Problem List and refresh.. Pharmacy reviewed the patient's [id number] medications and allergies for accuracy.    The list below reflectives the updated PTA list. Please review each medication in order reconciliation for additional clarification and justification.  Prior to Admission medications    Medication Sig Start Date End Date Taking? Authorizing Provider   amLODIPine (Norvasc) 10 mg tablet Take 1 tablet (10 mg) by mouth once daily. 8/30/23  Yes Historical Provider, MD   ascorbic acid (Vitamin C) 500 mg tablet Take 6 tablets (3,000 mg) by mouth once daily. 6/22/17  Yes Historical Provider, MD   cholecalciferol (Vitamin D-3) 25 MCG (1000 UT) tablet Take 1 tablet (25 mcg) by mouth once daily. 6/22/17  Yes Historical Provider, MD   gabapentin (Neurontin) 100 mg capsule Take 1 capsule (100 mg) by mouth 4 times a day as needed.   Yes Historical Provider, MD   naproxen sodium (Aleve) 220 mg tablet Take 1 tablet (220 mg) by mouth 2 times daily (morning and late afternoon). 6/22/17  Yes Historical Provider, MD   oxyCODONE-acetaminophen (Percocet)  mg tablet TAKE 1 TABLET BY MOUTH 5 TIMES A DAY AS NEEDED 11/17/23  Yes Historical Provider, MD   ramipril (Altace) 10 mg capsule Take 2 capsules (20 mg) by mouth once daily. 6/20/24 7/20/24 Yes Mike Spenecr MD   sertraline (Zoloft) 50 mg tablet Take 1.5 tablets (75 mg) by mouth once daily. 6/19/24 6/19/25 Yes Nnamdi Serrano MD   metoprolol succinate XL (Toprol-XL) 100 mg 24 hr tablet Take 1 tablet (100 mg) by mouth 2 times a day. 2/27/13 7/2/24 Yes Historical Provider, MD   metoprolol succinate XL (Toprol-XL) 100 mg 24 hr tablet TAKE 1 TABLET BY MOUTH 2 TIMES A DAY 12/29/23   Jos James MD   sildenafil (Viagra) 100 mg tablet TAKE 1 TABLET BY MOUTH PER DAY 1 HOUR  BEFORE NEEDED 12/18/23   Jos James MD   sildenafil (Viagra) 100 mg tablet TAKE 1 TABLET BY MOUTH EVERY DAY 1 HOUR BEFORE NEEDED 1/26/24   Jos James MD   amLODIPine (Norvasc) 5 mg tablet Take 1 tablet (5 mg) by mouth once daily. 6/15/12 7/2/24  Historical Provider, MD   LORazepam (Ativan) 1 mg tablet TAKE 1 TABLET BY MOUTH 15 minutes before MRI 7/7/23 7/2/24  Historical Provider, MD   oxyCODONE-acetaminophen (Percocet) 7.5-325 mg tablet Take by mouth. 2/26/13 7/2/24  Historical Provider, MD   predniSONE (Deltasone) 10 mg tablet TAKE 4 TABLETS BY MOUTH DAILY FOR 2 DAYS, THEN 3 TABLETS DAILY FOR 2 DAYS, THEN 2 TABLETS DAILY FOR 2 DAYS, THEN STOP 6/29/23 7/2/24  Historical Provider, MD   sildenafil (Viagra) 100 mg tablet Take by mouth. 2/23/15 7/2/24  Historical Provider, MD        The list below reflectives the updated allergy list. Please review each documented allergy for additional clarification and justification.  Allergies  Reviewed by Alejandro Stiles on 7/2/2024        Severity Reactions Comments    Latex High Hives, Itching     Nsaids (non-steroidal Anti-inflammatory Drug) Medium GI Upset, Nausea/vomiting     Pentazocine Medium GI Upset, Nausea/vomiting             Below are additional concerns with the patient's PTA list.  None    Source: Patient + wife at bedside    Alejandro Stiles

## 2024-07-03 DIAGNOSIS — I10 BENIGN ESSENTIAL HYPERTENSION: Primary | ICD-10-CM

## 2024-07-03 RX ORDER — AMLODIPINE BESYLATE 10 MG/1
10 TABLET ORAL DAILY
Qty: 30 TABLET | Refills: 0 | Status: SHIPPED | OUTPATIENT
Start: 2024-07-03 | End: 2024-08-02

## 2024-07-23 DIAGNOSIS — I10 BENIGN ESSENTIAL HYPERTENSION: ICD-10-CM

## 2024-07-24 ENCOUNTER — APPOINTMENT (OUTPATIENT)
Dept: PRIMARY CARE | Facility: CLINIC | Age: 69
End: 2024-07-24
Payer: MEDICARE

## 2024-07-24 VITALS
OXYGEN SATURATION: 93 % | WEIGHT: 163 LBS | TEMPERATURE: 97.2 F | HEIGHT: 68 IN | DIASTOLIC BLOOD PRESSURE: 71 MMHG | BODY MASS INDEX: 24.71 KG/M2 | SYSTOLIC BLOOD PRESSURE: 113 MMHG

## 2024-07-24 DIAGNOSIS — Z12.5 PROSTATE CANCER SCREENING: ICD-10-CM

## 2024-07-24 DIAGNOSIS — R35.0 BENIGN PROSTATIC HYPERPLASIA WITH URINARY FREQUENCY: ICD-10-CM

## 2024-07-24 DIAGNOSIS — I10 BENIGN ESSENTIAL HYPERTENSION: ICD-10-CM

## 2024-07-24 DIAGNOSIS — K21.9 GASTROESOPHAGEAL REFLUX DISEASE WITHOUT ESOPHAGITIS: Primary | ICD-10-CM

## 2024-07-24 DIAGNOSIS — N52.9 MALE ERECTILE DISORDER OF ORGANIC ORIGIN: ICD-10-CM

## 2024-07-24 DIAGNOSIS — F33.9 RECURRENT MAJOR DEPRESSIVE DISORDER, REMISSION STATUS UNSPECIFIED (CMS-HCC): ICD-10-CM

## 2024-07-24 DIAGNOSIS — M48.00 SPINAL STENOSIS, UNSPECIFIED SPINAL REGION: ICD-10-CM

## 2024-07-24 DIAGNOSIS — N40.1 BENIGN PROSTATIC HYPERPLASIA WITH URINARY FREQUENCY: ICD-10-CM

## 2024-07-24 PROCEDURE — 1159F MED LIST DOCD IN RCRD: CPT | Performed by: INTERNAL MEDICINE

## 2024-07-24 PROCEDURE — 1125F AMNT PAIN NOTED PAIN PRSNT: CPT | Performed by: INTERNAL MEDICINE

## 2024-07-24 PROCEDURE — 3008F BODY MASS INDEX DOCD: CPT | Performed by: INTERNAL MEDICINE

## 2024-07-24 PROCEDURE — 99214 OFFICE O/P EST MOD 30 MIN: CPT | Performed by: INTERNAL MEDICINE

## 2024-07-24 PROCEDURE — 3074F SYST BP LT 130 MM HG: CPT | Performed by: INTERNAL MEDICINE

## 2024-07-24 PROCEDURE — 3078F DIAST BP <80 MM HG: CPT | Performed by: INTERNAL MEDICINE

## 2024-07-24 PROCEDURE — 1160F RVW MEDS BY RX/DR IN RCRD: CPT | Performed by: INTERNAL MEDICINE

## 2024-07-24 RX ORDER — FINASTERIDE 5 MG/1
5 TABLET, FILM COATED ORAL DAILY
Qty: 90 TABLET | Refills: 1 | Status: SHIPPED | OUTPATIENT
Start: 2024-07-24 | End: 2025-07-24

## 2024-07-24 RX ORDER — AMLODIPINE BESYLATE 10 MG/1
10 TABLET ORAL DAILY
Qty: 90 TABLET | Refills: 1 | Status: SHIPPED | OUTPATIENT
Start: 2024-07-24 | End: 2025-07-24

## 2024-07-24 RX ORDER — TAMSULOSIN HYDROCHLORIDE 0.4 MG/1
0.4 CAPSULE ORAL DAILY
Qty: 90 CAPSULE | Refills: 1 | Status: SHIPPED | OUTPATIENT
Start: 2024-07-24 | End: 2025-07-24

## 2024-07-24 RX ORDER — OMEPRAZOLE 20 MG/1
20 TABLET, DELAYED RELEASE ORAL DAILY
Qty: 90 TABLET | Refills: 1 | Status: SHIPPED | OUTPATIENT
Start: 2024-07-24 | End: 2025-07-24

## 2024-07-24 ASSESSMENT — PATIENT HEALTH QUESTIONNAIRE - PHQ9
2. FEELING DOWN, DEPRESSED OR HOPELESS: NOT AT ALL
1. LITTLE INTEREST OR PLEASURE IN DOING THINGS: NOT AT ALL
SUM OF ALL RESPONSES TO PHQ9 QUESTIONS 1 AND 2: 0

## 2024-07-24 ASSESSMENT — COLUMBIA-SUICIDE SEVERITY RATING SCALE - C-SSRS
1. IN THE PAST MONTH, HAVE YOU WISHED YOU WERE DEAD OR WISHED YOU COULD GO TO SLEEP AND NOT WAKE UP?: NO
2. HAVE YOU ACTUALLY HAD ANY THOUGHTS OF KILLING YOURSELF?: NO
6. HAVE YOU EVER DONE ANYTHING, STARTED TO DO ANYTHING, OR PREPARED TO DO ANYTHING TO END YOUR LIFE?: NO

## 2024-07-24 ASSESSMENT — ENCOUNTER SYMPTOMS
DEPRESSION: 0
LOSS OF SENSATION IN FEET: 0
OCCASIONAL FEELINGS OF UNSTEADINESS: 1

## 2024-07-24 ASSESSMENT — PAIN SCALES - GENERAL: PAINLEVEL: 8
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
## 2024-07-25 RX ORDER — RAMIPRIL 10 MG/1
20 CAPSULE ORAL DAILY
Qty: 180 CAPSULE | Refills: 1 | Status: SHIPPED | OUTPATIENT
Start: 2024-07-25

## 2024-07-29 ASSESSMENT — ENCOUNTER SYMPTOMS
PSYCHIATRIC NEGATIVE: 1
ENDOCRINE NEGATIVE: 1
NEUROLOGICAL NEGATIVE: 1
CARDIOVASCULAR NEGATIVE: 1
CONSTITUTIONAL NEGATIVE: 1
EYES NEGATIVE: 1
GASTROINTESTINAL NEGATIVE: 1
ALLERGIC/IMMUNOLOGIC NEGATIVE: 1
RESPIRATORY NEGATIVE: 1
MUSCULOSKELETAL NEGATIVE: 1
HEMATOLOGIC/LYMPHATIC NEGATIVE: 1

## 2024-07-31 ENCOUNTER — OFFICE VISIT (OUTPATIENT)
Dept: CARDIOLOGY | Facility: CLINIC | Age: 69
End: 2024-07-31
Payer: MEDICARE

## 2024-07-31 VITALS
WEIGHT: 166 LBS | BODY MASS INDEX: 25.24 KG/M2 | HEART RATE: 67 BPM | DIASTOLIC BLOOD PRESSURE: 70 MMHG | OXYGEN SATURATION: 97 % | SYSTOLIC BLOOD PRESSURE: 101 MMHG

## 2024-07-31 DIAGNOSIS — R07.89 MUSCULOSKELETAL CHEST PAIN: Primary | ICD-10-CM

## 2024-07-31 PROCEDURE — 99204 OFFICE O/P NEW MOD 45 MIN: CPT | Performed by: INTERNAL MEDICINE

## 2024-07-31 PROCEDURE — 99214 OFFICE O/P EST MOD 30 MIN: CPT | Performed by: INTERNAL MEDICINE

## 2024-07-31 PROCEDURE — 1126F AMNT PAIN NOTED NONE PRSNT: CPT | Performed by: INTERNAL MEDICINE

## 2024-07-31 PROCEDURE — 3074F SYST BP LT 130 MM HG: CPT | Performed by: INTERNAL MEDICINE

## 2024-07-31 PROCEDURE — 3078F DIAST BP <80 MM HG: CPT | Performed by: INTERNAL MEDICINE

## 2024-07-31 PROCEDURE — 1036F TOBACCO NON-USER: CPT | Performed by: INTERNAL MEDICINE

## 2024-07-31 PROCEDURE — 1159F MED LIST DOCD IN RCRD: CPT | Performed by: INTERNAL MEDICINE

## 2024-07-31 ASSESSMENT — ENCOUNTER SYMPTOMS
OCCASIONAL FEELINGS OF UNSTEADINESS: 1
DEPRESSION: 0
LOSS OF SENSATION IN FEET: 0

## 2024-07-31 ASSESSMENT — PAIN SCALES - GENERAL: PAINLEVEL: 0-NO PAIN

## 2024-07-31 ASSESSMENT — PATIENT HEALTH QUESTIONNAIRE - PHQ9
1. LITTLE INTEREST OR PLEASURE IN DOING THINGS: NOT AT ALL
SUM OF ALL RESPONSES TO PHQ9 QUESTIONS 1 AND 2: 0
2. FEELING DOWN, DEPRESSED OR HOPELESS: NOT AT ALL

## 2024-07-31 NOTE — PROGRESS NOTES
Subjective      Chief Complaint   Patient presents with    <9>was in er was told to follow up with cardiologist         This patient was referred to me by the [facility]  emergency room for an atypical noncardiac type chest pain syndrome, with the ER physician noting the following ...69-year-old male with a history of dementia and chronic spinal stenosis presents with chest pain.  He had a 2 to 3-hour episode of sharp chest pain that had no provocative or palliative factors.  He reports nausea and diaphoresis during this episode.  He denies shortness of breath, leg swelling and history of DVT/PE.  His symptoms resolved spontaneously and he is currently pain-free.  The pain was nonradiating    His EKG showed a normal sinus rhythm with some voltage criteria for left ventricular hypertrophy, heart rate 68 bpm, normal axis and intervals, QTc 397ms.    His troponin levels were normal, his chest x-ray showed no acute cardiopulmonary processes he was discharged from the emergency room and referred to my office.              Review of Systems   All other systems reviewed and are negative.       Objective   Physical Exam  Constitutional:       Appearance: Normal appearance.   HENT:      Head: Normocephalic and atraumatic.   Eyes:      Pupils: Pupils are equal, round, and reactive to light.   Cardiovascular:      Rate and Rhythm: Normal rate and regular rhythm.      Pulses: Normal pulses.      Heart sounds: Normal heart sounds.   Pulmonary:      Effort: Pulmonary effort is normal.      Breath sounds: Normal breath sounds.   Abdominal:      General: Abdomen is flat. Bowel sounds are normal.      Palpations: Abdomen is soft.   Musculoskeletal:         General: Normal range of motion.      Cervical back: Normal range of motion.   Skin:     General: Skin is warm and dry.   Neurological:      General: No focal deficit present.   Psychiatric:         Mood and Affect: Mood normal.         Judgment: Judgment normal.          Lab  Review:   Not applicable    No problem-specific Assessment & Plan notes found for this encounter.

## 2024-07-31 NOTE — ASSESSMENT & PLAN NOTE
We discussed his symptoms and the likelihood that his chest pain was noncardiac is somewhere between 97 and 99%.  His symptoms are more consistent with his chronic cervical spinal stenosis that has been present since 1975.  Consequently he will follow-up with his primary care physician

## 2024-08-12 DIAGNOSIS — I10 BENIGN ESSENTIAL HYPERTENSION: ICD-10-CM

## 2024-08-12 RX ORDER — RAMIPRIL 10 MG/1
20 CAPSULE ORAL DAILY
Qty: 60 CAPSULE | Refills: 0 | Status: SHIPPED | OUTPATIENT
Start: 2024-08-12

## 2024-08-19 ENCOUNTER — TELEPHONE (OUTPATIENT)
Dept: PRIMARY CARE | Facility: CLINIC | Age: 69
End: 2024-08-19
Payer: MEDICARE

## 2024-08-21 ENCOUNTER — APPOINTMENT (OUTPATIENT)
Dept: UROLOGY | Facility: CLINIC | Age: 69
End: 2024-08-21
Payer: MEDICARE

## 2024-09-06 DIAGNOSIS — I10 BENIGN ESSENTIAL HYPERTENSION: ICD-10-CM

## 2024-09-09 RX ORDER — RAMIPRIL 10 MG/1
20 CAPSULE ORAL DAILY
Qty: 180 CAPSULE | Refills: 0 | Status: SHIPPED | OUTPATIENT
Start: 2024-09-09

## 2024-09-19 ENCOUNTER — APPOINTMENT (OUTPATIENT)
Dept: RADIOLOGY | Facility: HOSPITAL | Age: 69
End: 2024-09-19
Payer: MEDICARE

## 2024-09-19 ENCOUNTER — HOSPITAL ENCOUNTER (EMERGENCY)
Facility: HOSPITAL | Age: 69
Discharge: HOME | End: 2024-09-19
Attending: EMERGENCY MEDICINE
Payer: MEDICARE

## 2024-09-19 VITALS
BODY MASS INDEX: 27.28 KG/M2 | HEART RATE: 68 BPM | HEIGHT: 68 IN | OXYGEN SATURATION: 96 % | TEMPERATURE: 98.4 F | SYSTOLIC BLOOD PRESSURE: 134 MMHG | DIASTOLIC BLOOD PRESSURE: 82 MMHG | WEIGHT: 180 LBS | RESPIRATION RATE: 18 BRPM
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
## 2024-09-19 DIAGNOSIS — S01.01XA OCCIPITAL SCALP LACERATION, INITIAL ENCOUNTER: ICD-10-CM

## 2024-09-19 PROCEDURE — 90715 TDAP VACCINE 7 YRS/> IM: CPT | Performed by: PHYSICIAN ASSISTANT

## 2024-09-19 PROCEDURE — 2500000005 HC RX 250 GENERAL PHARMACY W/O HCPCS: Performed by: PHYSICIAN ASSISTANT

## 2024-09-19 PROCEDURE — 70450 CT HEAD/BRAIN W/O DYE: CPT | Performed by: RADIOLOGY

## 2024-09-19 PROCEDURE — 90471 IMMUNIZATION ADMIN: CPT | Performed by: PHYSICIAN ASSISTANT

## 2024-09-19 PROCEDURE — 70450 CT HEAD/BRAIN W/O DYE: CPT

## 2024-09-19 PROCEDURE — 2500000001 HC RX 250 WO HCPCS SELF ADMINISTERED DRUGS (ALT 637 FOR MEDICARE OP): Performed by: PHYSICIAN ASSISTANT

## 2024-09-19 PROCEDURE — 2500000004 HC RX 250 GENERAL PHARMACY W/ HCPCS (ALT 636 FOR OP/ED): Performed by: PHYSICIAN ASSISTANT

## 2024-09-19 PROCEDURE — 12032 INTMD RPR S/A/T/EXT 2.6-7.5: CPT | Performed by: PHYSICIAN ASSISTANT

## 2024-09-19 PROCEDURE — 72125 CT NECK SPINE W/O DYE: CPT

## 2024-09-19 PROCEDURE — 72125 CT NECK SPINE W/O DYE: CPT | Performed by: RADIOLOGY

## 2024-09-19 PROCEDURE — 99285 EMERGENCY DEPT VISIT HI MDM: CPT | Mod: 25

## 2024-09-19 RX ORDER — OXYCODONE HYDROCHLORIDE 5 MG/1
5 TABLET ORAL ONCE
Status: COMPLETED | OUTPATIENT
Start: 2024-09-19 | End: 2024-09-19

## 2024-09-19 RX ORDER — LIDOCAINE HYDROCHLORIDE AND EPINEPHRINE 10; 10 MG/ML; UG/ML
10 INJECTION, SOLUTION INFILTRATION; PERINEURAL ONCE
Status: COMPLETED | OUTPATIENT
Start: 2024-09-19 | End: 2024-09-19

## 2024-09-19 RX ADMIN — OXYCODONE HYDROCHLORIDE 5 MG: 5 TABLET ORAL at 14:50

## 2024-09-19 RX ADMIN — TETANUS TOXOID, REDUCED DIPHTHERIA TOXOID AND ACELLULAR PERTUSSIS VACCINE, ADSORBED 0.5 ML: 5; 2.5; 8; 8; 2.5 SUSPENSION INTRAMUSCULAR at 14:21

## 2024-09-19 RX ADMIN — LIDOCAINE HYDROCHLORIDE,EPINEPHRINE BITARTRATE 10 ML: 10; .01 INJECTION, SOLUTION INFILTRATION; PERINEURAL at 14:23

## 2024-09-19 ASSESSMENT — COLUMBIA-SUICIDE SEVERITY RATING SCALE - C-SSRS
6. HAVE YOU EVER DONE ANYTHING, STARTED TO DO ANYTHING, OR PREPARED TO DO ANYTHING TO END YOUR LIFE?: NO
1. IN THE PAST MONTH, HAVE YOU WISHED YOU WERE DEAD OR WISHED YOU COULD GO TO SLEEP AND NOT WAKE UP?: NO
2. HAVE YOU ACTUALLY HAD ANY THOUGHTS OF KILLING YOURSELF?: NO

## 2024-09-19 NOTE — DISCHARGE INSTRUCTIONS
CT scan of your head and neck today showed no evidence of trauma from your fall today.  You do have degenerative disc disease in your cervical spine.  You had 5 sutures placed in your right back part of your scalp where your laceration is.  You will need to have the sutures removed in 10 to 14 days.  Please follow-up with your primary care doctor in 1 to 2 days for reevaluation.  If you begin to have worsening headaches, visual disturbances, difficulty ambulating, chest pain, shortness of breath or lightheadedness return to the emergency department for reevaluation.

## 2024-09-19 NOTE — PROGRESS NOTES
Attestation/Supervisory note for TEJA Montez      The patient is a 69-year-old male presenting to the emergency department by EMS from home after a mechanical fall at home with a head injury.  The patient does not use any blood thinners.  The patient reportedly does have chronic balance issues and is only supposed to be ambulating with the use of a walker but he does not use it all the time.  He reportedly was trying to get up and walk and he lost his balance and fell backwards and hit his head.  This happened approximately 30 to 45 minutes prior to arrival.  He denies having any current symptoms other than a posterior headache.  It is a dull aching pain.  No better or worse.  No radiation.  He denies any focal weakness or numbness.  No visual changes.  No difficulty swallowing or speaking.  No chest pain or shortness of breath.  No abdominal pain.  No nausea vomiting.  No diarrhea or constipation but no urinary complaints.  All pertinent positives and negatives are recorded above.  All other systems reviewed and otherwise negative.  Vital signs mild diastolic hypertension but otherwise within normal limits.  Physical exam with a well-nourished well-developed male with disheveled appearance but no evidence of acute distress.  HEENT exam with a posterior scalp laceration but otherwise unremarkable.  He has no evidence of airway compromise or respiratory distress.  Abdominal exam is benign.  He does not have any gross motor, neurologic or vascular deficits on exam.  Pulses are equal bilaterally.  Denies stroke scale score of 0.      Wound care provided by nursing staff.  Tetanus was updated.      CT head wo IV contrast   Final Result        Small scalp laceration with mild scalp edema in the posterior right   parietal-occipital scalp. No depressed skull fracture. No acute   intracranial bleed or focal mass effect.        No CT evidence of cervical spine fracture in this exam.        Cervical spine DJD throughout as  described.        Mild-to-moderate volume loss in the brain.        Chronic left maxillary sinusitis.        MACRO:   None        Signed by: Thomas Talavera 9/19/2024 2:14 PM   Dictation workstation:   [id number]      CT cervical spine wo IV contrast   Final Result        Small scalp laceration with mild scalp edema in the posterior right   parietal-occipital scalp. No depressed skull fracture. No acute   intracranial bleed or focal mass effect.        No CT evidence of cervical spine fracture in this exam.        Cervical spine DJD throughout as described.        Mild-to-moderate volume loss in the brain.        Chronic left maxillary sinusitis.        MACRO:   None        Signed by: Thomas Talavera 9/19/2024 2:14 PM   Dictation workstation:   [id number]           The patient does not have any gross motor, neurologic or vascular deficits on exam.  He did complete a trial of ambulation in the emergency department.  The patient did not have any visible or palpable bony deformities.  He did not have any evidence of intracranial hemorrhage, skull fracture or mass effect on CT head.  No evidence of fracture or dislocation on CT C-spine.  The wound was repaired by TEJA Montez without complication.      The patient was released in good condition in the company of his wife.  He will follow-up with his primary care physician within 1 to 2 days for further management of his current symptoms, a wound check, and repeat check of his blood pressure.  He will also follow-up with his primary care physician within 7 days for suture removal.  He will return to the emergency department sooner with worsening of symptoms or onset of new symptoms.      Impression/diagnosis:  Fall from standing height, initial encounter  Head injury, initial encounter  Posterior scalp laceration  Diastolic hypertension      Critical care time billing is not warranted at this time.      I personally saw the patient and made/approve the management plan  and take responsibility for the patient management.      I personally discussed the patient's management with the patient      I reviewed the results of the diagnostic imaging.  Formal radiology read was completed by the radiologist.      Yancy Daniel MD
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
## 2024-09-25 ENCOUNTER — APPOINTMENT (OUTPATIENT)
Dept: UROLOGY | Facility: CLINIC | Age: 69
End: 2024-09-25
Payer: MEDICARE

## 2024-10-01 ENCOUNTER — APPOINTMENT (OUTPATIENT)
Dept: PRIMARY CARE | Facility: CLINIC | Age: 69
End: 2024-10-01
Payer: MEDICARE

## 2024-10-01 VITALS
OXYGEN SATURATION: 96 % | SYSTOLIC BLOOD PRESSURE: 144 MMHG | DIASTOLIC BLOOD PRESSURE: 81 MMHG | WEIGHT: 163 LBS | BODY MASS INDEX: 24.71 KG/M2 | HEART RATE: 65 BPM | HEIGHT: 68 IN

## 2024-10-01 DIAGNOSIS — Z12.11 SCREENING FOR COLORECTAL CANCER: ICD-10-CM

## 2024-10-01 DIAGNOSIS — M48.00 SPINAL STENOSIS, UNSPECIFIED SPINAL REGION: ICD-10-CM

## 2024-10-01 DIAGNOSIS — Z23 NEED FOR VACCINATION: ICD-10-CM

## 2024-10-01 DIAGNOSIS — N40.1 BENIGN PROSTATIC HYPERPLASIA WITH URINARY FREQUENCY: Primary | ICD-10-CM

## 2024-10-01 DIAGNOSIS — Z23 FLU VACCINE NEED: ICD-10-CM

## 2024-10-01 DIAGNOSIS — Z00.00 ROUTINE GENERAL MEDICAL EXAMINATION AT HEALTH CARE FACILITY: ICD-10-CM

## 2024-10-01 DIAGNOSIS — R35.0 BENIGN PROSTATIC HYPERPLASIA WITH URINARY FREQUENCY: Primary | ICD-10-CM

## 2024-10-01 DIAGNOSIS — Z12.5 SCREENING FOR PROSTATE CANCER: ICD-10-CM

## 2024-10-01 DIAGNOSIS — Z87.891 PERSONAL HISTORY OF TOBACCO USE, PRESENTING HAZARDS TO HEALTH: ICD-10-CM

## 2024-10-01 DIAGNOSIS — Z12.12 SCREENING FOR COLORECTAL CANCER: ICD-10-CM

## 2024-10-01 DIAGNOSIS — I10 ESSENTIAL HYPERTENSION: ICD-10-CM

## 2024-10-01 DIAGNOSIS — Z48.02 ENCOUNTER FOR REMOVAL OF SUTURES: ICD-10-CM

## 2024-10-01 DIAGNOSIS — Z13.6 SCREENING FOR CARDIOVASCULAR CONDITION: ICD-10-CM

## 2024-10-01 PROCEDURE — G0008 ADMIN INFLUENZA VIRUS VAC: HCPCS | Performed by: INTERNAL MEDICINE

## 2024-10-01 PROCEDURE — 90673 RIV3 VACCINE NO PRESERV IM: CPT | Performed by: INTERNAL MEDICINE

## 2024-10-01 PROCEDURE — 1125F AMNT PAIN NOTED PAIN PRSNT: CPT | Performed by: INTERNAL MEDICINE

## 2024-10-01 PROCEDURE — 3079F DIAST BP 80-89 MM HG: CPT | Performed by: INTERNAL MEDICINE

## 2024-10-01 PROCEDURE — 1159F MED LIST DOCD IN RCRD: CPT | Performed by: INTERNAL MEDICINE

## 2024-10-01 PROCEDURE — 3077F SYST BP >= 140 MM HG: CPT | Performed by: INTERNAL MEDICINE

## 2024-10-01 PROCEDURE — 3008F BODY MASS INDEX DOCD: CPT | Performed by: INTERNAL MEDICINE

## 2024-10-01 PROCEDURE — 1160F RVW MEDS BY RX/DR IN RCRD: CPT | Performed by: INTERNAL MEDICINE

## 2024-10-01 PROCEDURE — 1170F FXNL STATUS ASSESSED: CPT | Performed by: INTERNAL MEDICINE

## 2024-10-01 RX ORDER — METOPROLOL SUCCINATE 100 MG/1
100 TABLET, EXTENDED RELEASE ORAL 2 TIMES DAILY
Qty: 180 TABLET | Refills: 2 | Status: SHIPPED | OUTPATIENT
Start: 2024-10-01 | End: 2025-10-01

## 2024-10-01 ASSESSMENT — ACTIVITIES OF DAILY LIVING (ADL)
BATHING: DEPENDENT
MANAGING_FINANCES: TOTAL CARE
TAKING_MEDICATION: TOTAL CARE
DRESSING: DEPENDENT
DOING_HOUSEWORK: TOTAL CARE
GROCERY_SHOPPING: TOTAL CARE

## 2024-10-01 ASSESSMENT — ENCOUNTER SYMPTOMS
DEPRESSION: 0
OCCASIONAL FEELINGS OF UNSTEADINESS: 0
LOSS OF SENSATION IN FEET: 0

## 2024-10-01 ASSESSMENT — PATIENT HEALTH QUESTIONNAIRE - PHQ9
SUM OF ALL RESPONSES TO PHQ9 QUESTIONS 1 AND 2: 0
2. FEELING DOWN, DEPRESSED OR HOPELESS: NOT AT ALL
1. LITTLE INTEREST OR PLEASURE IN DOING THINGS: NOT AT ALL
2. FEELING DOWN, DEPRESSED OR HOPELESS: NOT AT ALL
1. LITTLE INTEREST OR PLEASURE IN DOING THINGS: NOT AT ALL
SUM OF ALL RESPONSES TO PHQ9 QUESTIONS 1 AND 2: 0

## 2024-10-01 ASSESSMENT — COLUMBIA-SUICIDE SEVERITY RATING SCALE - C-SSRS
1. IN THE PAST MONTH, HAVE YOU WISHED YOU WERE DEAD OR WISHED YOU COULD GO TO SLEEP AND NOT WAKE UP?: NO
6. HAVE YOU EVER DONE ANYTHING, STARTED TO DO ANYTHING, OR PREPARED TO DO ANYTHING TO END YOUR LIFE?: NO
2. HAVE YOU ACTUALLY HAD ANY THOUGHTS OF KILLING YOURSELF?: NO

## 2024-10-01 ASSESSMENT — PAIN SCALES - GENERAL: PAINLEVEL: 8

## 2024-10-01 NOTE — ASSESSMENT & PLAN NOTE
He will be referred to palliative care regarding history of spinal stenosis and chronic low back pain  Orders:    Referral to Palliative Care; Future
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
## 2024-10-05 ASSESSMENT — ENCOUNTER SYMPTOMS
NEUROLOGICAL NEGATIVE: 1
MUSCULOSKELETAL NEGATIVE: 1
GASTROINTESTINAL NEGATIVE: 1
CARDIOVASCULAR NEGATIVE: 1
EYES NEGATIVE: 1
ALLERGIC/IMMUNOLOGIC NEGATIVE: 1
RESPIRATORY NEGATIVE: 1
HEMATOLOGIC/LYMPHATIC NEGATIVE: 1
ENDOCRINE NEGATIVE: 1
PSYCHIATRIC NEGATIVE: 1
CONSTITUTIONAL NEGATIVE: 1

## 2024-10-24 ENCOUNTER — APPOINTMENT (OUTPATIENT)
Dept: UROLOGY | Facility: CLINIC | Age: 69
End: 2024-10-24
Payer: MEDICARE

## 2024-10-24 DIAGNOSIS — G89.29 CHRONIC PAIN IN TESTICLE: ICD-10-CM

## 2024-10-24 DIAGNOSIS — N50.812 PAIN IN BOTH TESTICLES: ICD-10-CM

## 2024-10-24 DIAGNOSIS — Z12.5 SCREENING FOR PROSTATE CANCER: ICD-10-CM

## 2024-10-24 DIAGNOSIS — R32 URINARY INCONTINENCE, UNSPECIFIED TYPE: Primary | ICD-10-CM

## 2024-10-24 DIAGNOSIS — N50.811 PAIN IN BOTH TESTICLES: ICD-10-CM

## 2024-10-24 DIAGNOSIS — N50.819 CHRONIC PAIN IN TESTICLE: ICD-10-CM

## 2024-10-24 LAB
POC APPEARANCE, URINE: CLEAR
POC BILIRUBIN, URINE: NEGATIVE
POC BLOOD, URINE: NEGATIVE
POC COLOR, URINE: YELLOW
POC GLUCOSE, URINE: NEGATIVE MG/DL
POC KETONES, URINE: NEGATIVE MG/DL
POC LEUKOCYTES, URINE: NEGATIVE
POC NITRITE,URINE: NEGATIVE
POC PH, URINE: 7 PH
POC PROTEIN, URINE: NEGATIVE MG/DL
POC SPECIFIC GRAVITY, URINE: 1.02
POC UROBILINOGEN, URINE: 4 EU/DL

## 2024-10-24 PROCEDURE — G2211 COMPLEX E/M VISIT ADD ON: HCPCS | Performed by: NURSE PRACTITIONER

## 2024-10-24 PROCEDURE — 1036F TOBACCO NON-USER: CPT | Performed by: NURSE PRACTITIONER

## 2024-10-24 PROCEDURE — 51798 US URINE CAPACITY MEASURE: CPT | Performed by: NURSE PRACTITIONER

## 2024-10-24 PROCEDURE — 99203 OFFICE O/P NEW LOW 30 MIN: CPT | Performed by: NURSE PRACTITIONER

## 2024-10-24 PROCEDURE — 1160F RVW MEDS BY RX/DR IN RCRD: CPT | Performed by: NURSE PRACTITIONER

## 2024-10-24 PROCEDURE — 81003 URINALYSIS AUTO W/O SCOPE: CPT | Performed by: NURSE PRACTITIONER

## 2024-10-24 PROCEDURE — 1159F MED LIST DOCD IN RCRD: CPT | Performed by: NURSE PRACTITIONER

## 2024-10-24 NOTE — PROGRESS NOTES
[facility]  Outpatient Clinic Note    Subjective   Karl Lopez is a 69 y.o. male    History of Present Illness   Patient presenting to clinic today NPV with complaint of urinary   History of HTN, BPH with LUTS, Prostatitis, ED, Chronic Back Pain   and Hematuria.   Taking Finasteride 5 mg and Tamsulosin 0.4 mg daily   Sildenafil 100 mg with good results.   Patient reports that he has chronic testicular pain. No testicular   Trauma, swelling, or tenderness today. No fevers, chills, n/v.   Reports an increase in urinary incontinence the past few months.   NTF x 2, not bothersome. Feels that he empties bladder well, no UTI history. No stress incontinence. Wearing depends. Patient denies gross hematuria, dysuria, or frequency.      IPSS 21  [id number] 7    Urinalysis today is Normal   PVR 39 ml     Lab Results   Component Value Date    PSA 1.46 10/11/2021    PSA 1.16 05/20/2019     Past Medical History and Surgical History   No past medical history on file.  No past surgical history on file.    Medications  Current Outpatient Medications on File Prior to Visit   Medication Sig Dispense Refill    amLODIPine (Norvasc) 10 mg tablet Take 1 tablet (10 mg) by mouth once daily. 90 tablet 1    ascorbic acid (Vitamin C) 500 mg tablet Take 6 tablets (3,000 mg) by mouth once daily.      cholecalciferol (Vitamin D-3) 25 MCG (1000 UT) tablet Take 1 tablet (25 mcg) by mouth once daily.      finasteride (Proscar) 5 mg tablet Take 1 tablet (5 mg) by mouth once daily. Do not crush, chew, or split. 90 tablet 1    gabapentin (Neurontin) 100 mg capsule Take 1 capsule (100 mg) by mouth 4 times a day as needed.      metoprolol succinate XL (Toprol-XL) 100 mg 24 hr tablet Take 1 tablet (100 mg) by mouth 2 times a day. 180 tablet 2    omeprazole OTC (PriLOSEC OTC) 20 mg EC tablet Take 1 tablet (20 mg) by mouth once daily. Do not crush, chew, or split. (Patient taking differently: Take 1 tablet (20 mg) by mouth if needed. PRn) 90 tablet  1    oxyCODONE-acetaminophen (Percocet)  mg tablet TAKE 1 TABLET BY MOUTH 5 TIMES A DAY AS NEEDED      ramipril (Altace) 10 mg capsule TAKE 2 CAPSULES BY MOUTH EVERY  capsule 0    sertraline (Zoloft) 50 mg tablet Take 1.5 tablets (75 mg) by mouth once daily. 135 tablet 3    sildenafil (Viagra) 100 mg tablet TAKE 1 TABLET BY MOUTH PER DAY 1 HOUR BEFORE NEEDED 10 tablet 0    tamsulosin (Flomax) 0.4 mg 24 hr capsule Take 1 capsule (0.4 mg) by mouth once daily. 90 capsule 1     No current facility-administered medications on file prior to visit.       Objective   Physicial Exam  General: Well developed, well nourished, alert and cooperative, appears in no acute distress  Eyes: Non-injected conjunctiva, sclera clear, no proptosis  Cardiac: Extremities are warm and well perfused. No edema, cyanosis or pallor.   Lungs: Breathing is easy, non-labored. Speaking in clear and complete sentences. Normal diaphragmatic movement.  MSK: Ambulatory with steady gait, unassisted  Neuro: alert and oriented to person, place and time  Psych: Demonstrates good judgement and reason, without hallucinations, abnormal affect or abnormal behaviors.  Skin: no obvious lesions, no rashes.    Appointment on 10/24/2024   Component Date Value Ref Range Status    POC Color, Urine 10/24/2024 Yellow  Straw, Yellow, Light-Yellow Final    POC Appearance, Urine 10/24/2024 Clear  Clear Final    POC Glucose, Urine 10/24/2024 NEGATIVE  NEGATIVE mg/dl Final    POC Bilirubin, Urine 10/24/2024 NEGATIVE  NEGATIVE Final    POC Ketones, Urine 10/24/2024 NEGATIVE  NEGATIVE mg/dl Final    POC Specific Gravity, Urine 10/24/2024 1.020  1.005 - 1.035 Final    POC Blood, Urine 10/24/2024 NEGATIVE  NEGATIVE Final    POC PH, Urine 10/24/2024 7.0  No Reference Range Established PH Final    POC Protein, Urine 10/24/2024 NEGATIVE  NEGATIVE, 30 (1+) mg/dl Final    POC Urobilinogen, Urine 10/24/2024 4.0 (A)  0.2, 1.0 EU/DL Final    Poc Nitrite, Urine 10/24/2024  NEGATIVE  NEGATIVE Final    POC Leukocytes, Urine 10/24/2024 NEGATIVE  NEGATIVE Final      Review of Systems  All other systems have been reviewed and are negative for complaint.      Assessment and Plan   - Urinary incontinence  We discussed possible treatment options including doing conservative voiding techniques, medications, and surgical options. Patient was counseled regarding bladder retraining, diet choices, and fluid restriction.     Patient was informed that first line treatment is behavioral therapy. This includes:   -Fluid balancing and sometimes restriction   -Reducing caffeine or other bladder stimulants   -Bladder retraining  -Avoid constipation  -Avoid activities that exacerbate incontinence  -Kegel exercises and pelvic floor muscle training  PFPT Referral given. Patient will schedule.     - Chronic testicular pain   We discussed obtaining scrotal ultrasound for further evaluation of testicular pain, I will call with results. We discussed conservative measures including use of NSAIDs and good scrotal support.    RTC in 8 to 12 weeks, sooner if needed.     All questions and concerns were addressed. Patient verbalizes understanding and has no other questions at this time.   You are able to have email access to your chart. You can sign into [employer] or add the [employer] kurtis on your smart phone to review today's visit, laboratory work and imaging.     Ruth Van-- TIMOTHY NICK  Office Phone:  [phone]

## 2024-10-30 ENCOUNTER — APPOINTMENT (OUTPATIENT)
Dept: PRIMARY CARE | Facility: CLINIC | Age: 69
End: 2024-10-30
Payer: MEDICARE

## 2024-10-31 ENCOUNTER — HOSPITAL ENCOUNTER (OUTPATIENT)
Dept: RADIOLOGY | Facility: CLINIC | Age: 69
Discharge: HOME | End: 2024-10-31
Payer: MEDICARE

## 2024-10-31 DIAGNOSIS — G89.29 CHRONIC PAIN IN TESTICLE: ICD-10-CM

## 2024-10-31 DIAGNOSIS — N50.811 PAIN IN BOTH TESTICLES: ICD-10-CM

## 2024-10-31 DIAGNOSIS — N50.819 CHRONIC PAIN IN TESTICLE: ICD-10-CM

## 2024-10-31 DIAGNOSIS — N50.812 PAIN IN BOTH TESTICLES: ICD-10-CM

## 2024-10-31 PROCEDURE — 76870 US EXAM SCROTUM: CPT | Performed by: RADIOLOGY

## 2024-10-31 PROCEDURE — 93975 VASCULAR STUDY: CPT

## 2024-11-12 ENCOUNTER — APPOINTMENT (OUTPATIENT)
Dept: PALLIATIVE MEDICINE | Facility: CLINIC | Age: 69
End: 2024-11-12
Payer: MEDICARE

## 2024-11-12 ENCOUNTER — LAB (OUTPATIENT)
Dept: LAB | Facility: LAB | Age: 69
End: 2024-11-12
Payer: MEDICARE

## 2024-11-12 DIAGNOSIS — I10 BENIGN ESSENTIAL HYPERTENSION: ICD-10-CM

## 2024-11-12 DIAGNOSIS — Z12.5 SCREENING FOR PROSTATE CANCER: ICD-10-CM

## 2024-11-12 DIAGNOSIS — Z12.5 PROSTATE CANCER SCREENING: ICD-10-CM

## 2024-11-12 DIAGNOSIS — Z00.00 ROUTINE GENERAL MEDICAL EXAMINATION AT HEALTH CARE FACILITY: ICD-10-CM

## 2024-11-12 LAB
ANION GAP SERPL CALCULATED.3IONS-SCNC: 11 MMOL/L (ref 10–20)
APPEARANCE UR: CLEAR
BILIRUB UR STRIP.AUTO-MCNC: NEGATIVE MG/DL
BUN SERPL-MCNC: 14 MG/DL (ref 6–23)
CALCIUM SERPL-MCNC: 9.3 MG/DL (ref 8.6–10.3)
CHLORIDE SERPL-SCNC: 103 MMOL/L (ref 98–107)
CO2 SERPL-SCNC: 32 MMOL/L (ref 21–32)
COLOR UR: YELLOW
CREAT SERPL-MCNC: 0.72 MG/DL (ref 0.5–1.3)
EGFRCR SERPLBLD CKD-EPI 2021: >90 ML/MIN/1.73M*2
GLUCOSE SERPL-MCNC: 86 MG/DL (ref 74–99)
GLUCOSE UR STRIP.AUTO-MCNC: NORMAL MG/DL
KETONES UR STRIP.AUTO-MCNC: NEGATIVE MG/DL
LEUKOCYTE ESTERASE UR QL STRIP.AUTO: NEGATIVE
NITRITE UR QL STRIP.AUTO: NEGATIVE
PH UR STRIP.AUTO: 7 [PH]
POTASSIUM SERPL-SCNC: 4.1 MMOL/L (ref 3.5–5.3)
PROT UR STRIP.AUTO-MCNC: NEGATIVE MG/DL
RBC # UR STRIP.AUTO: NEGATIVE /UL
SODIUM SERPL-SCNC: 142 MMOL/L (ref 136–145)
SP GR UR STRIP.AUTO: 1.02
UROBILINOGEN UR STRIP.AUTO-MCNC: ABNORMAL MG/DL

## 2024-11-12 PROCEDURE — 81003 URINALYSIS AUTO W/O SCOPE: CPT

## 2024-11-12 PROCEDURE — G0103 PSA SCREENING: HCPCS

## 2024-11-12 PROCEDURE — 80048 BASIC METABOLIC PNL TOTAL CA: CPT

## 2024-11-12 PROCEDURE — 36415 COLL VENOUS BLD VENIPUNCTURE: CPT

## 2024-11-13 LAB
PSA SERPL-MCNC: 0.79 NG/ML
PSA SERPL-MCNC: 0.79 NG/ML

## 2024-11-21 DIAGNOSIS — F32.A DEPRESSION, UNSPECIFIED DEPRESSION TYPE: ICD-10-CM

## 2024-11-21 RX ORDER — SERTRALINE HYDROCHLORIDE 50 MG/1
75 TABLET, FILM COATED ORAL DAILY
Qty: 135 TABLET | Refills: 3 | Status: SHIPPED | OUTPATIENT
Start: 2024-11-21 | End: 2025-11-21

## 2024-11-26 ENCOUNTER — TELEPHONE (OUTPATIENT)
Dept: NEUROLOGY | Facility: CLINIC | Age: 69
End: 2024-11-26
Payer: MEDICARE

## 2024-11-26 DIAGNOSIS — F32.A DEPRESSION, UNSPECIFIED DEPRESSION TYPE: ICD-10-CM

## 2024-11-27 RX ORDER — SERTRALINE HYDROCHLORIDE 50 MG/1
100 TABLET, FILM COATED ORAL DAILY
Qty: 180 TABLET | Refills: 3 | Status: SHIPPED | OUTPATIENT
Start: 2024-11-27 | End: 2025-11-27

## 2024-11-27 NOTE — TELEPHONE ENCOUNTER
Covering for Dr. Serrano. Refill request was received and reviewed. Patient has been taking sertraline 100 mg daily with good effect. The following was transmitted electronically:    Requested Prescriptions     Pending Prescriptions Disp Refills    sertraline (Zoloft) 50 mg tablet 180 tablet 3     Sig: Take 2 tablets (100 mg) by mouth once daily.        Signed:  Blanca Gunderson MD  12:18 PM

## 2024-12-07 ENCOUNTER — HOSPITAL ENCOUNTER (EMERGENCY)
Facility: HOSPITAL | Age: 69
Discharge: HOME | End: 2024-12-07
Payer: MEDICARE

## 2024-12-07 ENCOUNTER — APPOINTMENT (OUTPATIENT)
Dept: RADIOLOGY | Facility: HOSPITAL | Age: 69
End: 2024-12-07
Payer: MEDICARE

## 2024-12-07 VITALS
WEIGHT: 163 LBS | OXYGEN SATURATION: 96 % | TEMPERATURE: 98.1 F | SYSTOLIC BLOOD PRESSURE: 132 MMHG | RESPIRATION RATE: 16 BRPM | HEIGHT: 68 IN | HEART RATE: 88 BPM | DIASTOLIC BLOOD PRESSURE: 75 MMHG | BODY MASS INDEX: 24.71 KG/M2
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
## 2024-12-07 DIAGNOSIS — S01.01XA LACERATION OF SCALP, INITIAL ENCOUNTER: ICD-10-CM

## 2024-12-07 DIAGNOSIS — S09.90XA CLOSED HEAD INJURY, INITIAL ENCOUNTER: ICD-10-CM

## 2024-12-07 PROCEDURE — 72131 CT LUMBAR SPINE W/O DYE: CPT

## 2024-12-07 PROCEDURE — 72125 CT NECK SPINE W/O DYE: CPT | Performed by: RADIOLOGY

## 2024-12-07 PROCEDURE — 12001 RPR S/N/AX/GEN/TRNK 2.5CM/<: CPT

## 2024-12-07 PROCEDURE — 99284 EMERGENCY DEPT VISIT MOD MDM: CPT | Mod: 25

## 2024-12-07 PROCEDURE — 72125 CT NECK SPINE W/O DYE: CPT

## 2024-12-07 PROCEDURE — 70450 CT HEAD/BRAIN W/O DYE: CPT

## 2024-12-07 PROCEDURE — 72131 CT LUMBAR SPINE W/O DYE: CPT | Performed by: RADIOLOGY

## 2024-12-07 PROCEDURE — 2500000001 HC RX 250 WO HCPCS SELF ADMINISTERED DRUGS (ALT 637 FOR MEDICARE OP)

## 2024-12-07 PROCEDURE — 70450 CT HEAD/BRAIN W/O DYE: CPT | Performed by: RADIOLOGY

## 2024-12-07 RX ORDER — OXYCODONE HYDROCHLORIDE 5 MG/1
10 TABLET ORAL ONCE
Status: COMPLETED | OUTPATIENT
Start: 2024-12-07 | End: 2024-12-07

## 2024-12-07 RX ORDER — ACETAMINOPHEN 325 MG/1
975 TABLET ORAL ONCE
Status: COMPLETED | OUTPATIENT
Start: 2024-12-07 | End: 2024-12-07

## 2024-12-07 RX ADMIN — OXYCODONE 10 MG: 5 TABLET ORAL at 20:16

## 2024-12-07 RX ADMIN — ACETAMINOPHEN 975 MG: 325 TABLET ORAL at 19:10

## 2024-12-07 ASSESSMENT — PAIN SCALES - GENERAL
PAINLEVEL_OUTOF10: 5 - MODERATE PAIN
PAINLEVEL_OUTOF10: 5 - MODERATE PAIN
PAINLEVEL_OUTOF10: 9

## 2024-12-07 ASSESSMENT — LIFESTYLE VARIABLES
HAVE PEOPLE ANNOYED YOU BY CRITICIZING YOUR DRINKING: NO
EVER FELT BAD OR GUILTY ABOUT YOUR DRINKING: NO
TOTAL SCORE: 0
EVER HAD A DRINK FIRST THING IN THE MORNING TO STEADY YOUR NERVES TO GET RID OF A HANGOVER: NO
HAVE YOU EVER FELT YOU SHOULD CUT DOWN ON YOUR DRINKING: NO

## 2024-12-07 ASSESSMENT — PAIN DESCRIPTION - LOCATION
LOCATION: BACK
LOCATION: BACK

## 2024-12-07 ASSESSMENT — PAIN - FUNCTIONAL ASSESSMENT: PAIN_FUNCTIONAL_ASSESSMENT: 0-10

## 2024-12-07 ASSESSMENT — PAIN DESCRIPTION - PAIN TYPE: TYPE: ACUTE PAIN

## 2024-12-07 NOTE — ED TRIAGE NOTES
Pt BIBA for mechanical fall. Pt lost hbis balance and hit his head. Pt has a laceration on the back of his head. Pt is not on blood thinners. Pt c/o neck pain. Pt placed in C collar by EMS.

## 2024-12-08 NOTE — DISCHARGE INSTRUCTIONS
Please follow-up closely with primary care provider in the following week.  Staples will need removed in 10 to 12 days.  Continue Tylenol and ibuprofen as needed for aches and pains.  Please review carenotes given for instructions on wound care moving forward. There is always a small possibility with any injury of missed fracture, tendon or ligamentous injury, retained foreign body etc. If your symptoms are worsening or wound is not healing, please follow-up closely with PCP for close monitoring for any acute complications.

## 2024-12-08 NOTE — ED PROVIDER NOTES
HPI   Chief Complaint   Patient presents with    Fall       Patient is a 69-year-old male presents emergency department for evaluation mechanical trip and fall with head injury and neck pain.  Patient reports that he was walking in his house when he tripped over a box and fell backwards.  He states he hit the back of his head.  He is having head and neck pain and some low back pain.  He did not lose any consciousness.  He states that he has some dementia and therefore has some difficulties with his memory, but otherwise denies any recent lightheadedness dizziness, fevers, chills.  He states that several months ago he was in the emergency department for fall as well and believes that he had a tetanus shot at that time does not require update at this time.  He denies any blood thinner use.  He denies any injury to upper or lower extremities otherwise and notes that he typically ambulates with a walker and cane at home.  He denies any chest pain, shortness of breath, nausea, vomiting, or other symptoms at this time.      History provided by:  Patient   used: No            Patient History   No past medical history on file.  No past surgical history on file.  No family history on file.  Social History     Tobacco Use    Smoking status: Former     Types: Cigarettes    Smokeless tobacco: Never   Substance Use Topics    Alcohol use: Not Currently     Comment: 1 beer a day    Drug use: Never       Physical Exam   ED Triage Vitals   Temperature Heart Rate Respirations BP   12/07/24 1840 12/07/24 1843 12/07/24 1840 12/07/24 1840   36.7 °C (98.1 °F) 90 16 (!) 180/100      Pulse Ox Temp src Heart Rate Source Patient Position   12/07/24 1840 -- -- --   95 %         BP Location FiO2 (%)     -- --             Physical Exam  Constitutional:       Appearance: Normal appearance.   HENT:      Head: Normocephalic.      Comments: Laceration to posterior left scalp.     Nose: Nose normal. No congestion.       Mouth/Throat:      Mouth: Mucous membranes are moist.   Eyes:      Extraocular Movements: Extraocular movements intact.      Pupils: Pupils are equal, round, and reactive to light.   Cardiovascular:      Rate and Rhythm: Normal rate and regular rhythm.   Pulmonary:      Effort: Pulmonary effort is normal.      Breath sounds: Normal breath sounds.   Abdominal:      General: Abdomen is flat.      Palpations: Abdomen is soft.      Tenderness: There is no abdominal tenderness.   Musculoskeletal:         General: Tenderness present. Normal range of motion.   Neurological:      General: No focal deficit present.      Mental Status: He is alert. Mental status is at baseline.      Comments: Alert and oriented to self and place, unable to tell exact year at baseline for patient due to dementia           ED Course & MDM   Diagnoses as of 12/07/24 1823   Fall, initial encounter   Closed head injury, initial encounter   Laceration of scalp, initial encounter                 No data recorded                                 Medical Decision Making  Patient is a 69-year-old male presents emergency department for evaluation of mechanical trip and fall with head injury and no loss of consciousness.    Labwork not warranted at today's visit.      Scans done today were interpreted/confirmed by radiologist and also interpreted by me which included CT head without contrast, CT cervical spine without contrast, CT lumbar spine without contrast.  CT head and neck shows no acute intracranial abnormality with no acute fracture or traumatic malalignment of cervical spine with multilevel degenerative changes and polyploid left maxillary sinus mucosal thickening with mural hyper leukocytosis and regions of high density material suggestive of chronic sinusitis.  CT lumbar spine shows no acute osseous abnormality with mild to moderate spinal canal/neural foraminal stenosis with postsurgical change related to L4-L5 posterior spinal fusion without  evidence for complication.    Medications given at today's visit include PO Tylenol    I saw this patient independently.  Patient's only complaint is some mild low back pain and head injury with scalp laceration.  Imaging studies reveal no acute traumatic abnormality to head neck or lumbar spine.  Tetanus was updated within the last 5 years and does not require tetanus at today's visit.  Laceration was copiously irrigated and cleaned by me and repaired as detailed in procedure section by me with staples.  Patient tolerated procedure well.  Wife does arrive and states that patient is currently at his mental baseline.  On attempt for trial of ambulation with walker patient refusing to stand up and wife states that this is his baseline and that she would like to take him home.  Patient declines wanting any lab work or further imaging for his generalized weakness and states that he just wants to go home and go to sleep.  He refuses to have any lab work done or to stay in the hospital for any further evaluation as he feels well and wife agrees and states that she would like to take patient home for further care.  After shared decision making I feel this is reasonable and given that both wife and patient do not want any lab work or further workup at this time or admission to the hospital patient be discharged back home.  Emergent pathologies were considered for this patient, although I have low suspicion for anything acutely emergent given patient's clinical presentation, history, physical exam, stable vital signs, and relatively unremarkable workup.  Discharging patient home is reasonable plan of care for outpatient management.    All labs, imaging, and diagnostic studies were reviewed by me and patient was counseled on clinical impression, expectations, and plan.  Patient was educated to follow-up with PCP in the following 1-2 days.  All questions from patient were answered. They elicited understanding and were  agreeable to course of treatment.  Patient was discharged in stable condition and given strict return precautions.    ** Disclaimer:  Parts of this document were written utilizing a voice to text dictation software.  Note may contain minor transcription or typographical errors that were inadvertently transcribed by the computer software.        Procedure  Laceration Repair    Performed by: Charley Blanco PA-C  Authorized by: Charley Blanco PA-C    Consent:     Consent obtained:  Verbal    Consent given by:  Patient and spouse    Risks, benefits, and alternatives were discussed: yes      Risks discussed:  Need for additional repair, nerve damage, infection, pain, poor cosmetic result, poor wound healing, tendon damage, vascular damage and retained foreign body    Alternatives discussed:  No treatment  Universal protocol:     Patient identity confirmed:  Verbally with patient and arm band  Anesthesia:     Anesthesia method:  None  Laceration details:     Location:  Scalp    Scalp location:  L parietal    Length (cm):  2.5  Treatment:     Area cleansed with:  Chlorhexidine    Amount of cleaning:  Standard  Skin repair:     Repair method:  Staples    Number of staples:  5  Repair type:     Repair type:  Simple  Post-procedure details:     Dressing:  Open (no dressing)    Procedure completion:  Tolerated       Charley Blanco PA-C  12/07/24 0360

## 2024-12-11 ENCOUNTER — TELEPHONE (OUTPATIENT)
Dept: PRIMARY CARE | Facility: CLINIC | Age: 69
End: 2024-12-11
Payer: MEDICARE

## 2024-12-18 ENCOUNTER — TELEPHONE (OUTPATIENT)
Dept: PRIMARY CARE | Facility: CLINIC | Age: 69
End: 2024-12-18

## 2024-12-18 ENCOUNTER — APPOINTMENT (OUTPATIENT)
Dept: PRIMARY CARE | Facility: CLINIC | Age: 69
End: 2024-12-18
Payer: MEDICARE

## 2024-12-18 VITALS
OXYGEN SATURATION: 98 % | HEIGHT: 68 IN | BODY MASS INDEX: 25.49 KG/M2 | WEIGHT: 168.21 LBS | SYSTOLIC BLOOD PRESSURE: 133 MMHG | DIASTOLIC BLOOD PRESSURE: 79 MMHG | HEART RATE: 62 BPM

## 2024-12-18 DIAGNOSIS — Z48.02 ENCOUNTER FOR STAPLE REMOVAL: Primary | ICD-10-CM

## 2024-12-18 PROCEDURE — 3075F SYST BP GE 130 - 139MM HG: CPT | Performed by: PHYSICIAN ASSISTANT

## 2024-12-18 PROCEDURE — 3008F BODY MASS INDEX DOCD: CPT | Performed by: PHYSICIAN ASSISTANT

## 2024-12-18 PROCEDURE — 1159F MED LIST DOCD IN RCRD: CPT | Performed by: PHYSICIAN ASSISTANT

## 2024-12-18 PROCEDURE — 3078F DIAST BP <80 MM HG: CPT | Performed by: PHYSICIAN ASSISTANT

## 2024-12-18 PROCEDURE — 1036F TOBACCO NON-USER: CPT | Performed by: PHYSICIAN ASSISTANT

## 2024-12-18 PROCEDURE — 99213 OFFICE O/P EST LOW 20 MIN: CPT | Performed by: PHYSICIAN ASSISTANT

## 2024-12-18 ASSESSMENT — ENCOUNTER SYMPTOMS
OCCASIONAL FEELINGS OF UNSTEADINESS: 1
DEPRESSION: 0
LOSS OF SENSATION IN FEET: 0

## 2024-12-20 ENCOUNTER — APPOINTMENT (OUTPATIENT)
Dept: UROLOGY | Facility: CLINIC | Age: 69
End: 2024-12-20
Payer: MEDICARE

## 2024-12-20 DIAGNOSIS — R35.0 BENIGN PROSTATIC HYPERPLASIA WITH URINARY FREQUENCY: ICD-10-CM

## 2024-12-20 DIAGNOSIS — R32 URINARY INCONTINENCE, UNSPECIFIED TYPE: ICD-10-CM

## 2024-12-20 DIAGNOSIS — N50.811 PAIN IN BOTH TESTICLES: Primary | ICD-10-CM

## 2024-12-20 DIAGNOSIS — N50.812 PAIN IN BOTH TESTICLES: Primary | ICD-10-CM

## 2024-12-20 DIAGNOSIS — N40.1 BENIGN PROSTATIC HYPERPLASIA WITH URINARY FREQUENCY: ICD-10-CM

## 2024-12-20 DIAGNOSIS — N52.9 ERECTILE DYSFUNCTION, UNSPECIFIED ERECTILE DYSFUNCTION TYPE: ICD-10-CM

## 2024-12-20 DIAGNOSIS — I10 BENIGN ESSENTIAL HYPERTENSION: ICD-10-CM

## 2024-12-20 PROCEDURE — G2211 COMPLEX E/M VISIT ADD ON: HCPCS | Performed by: NURSE PRACTITIONER

## 2024-12-20 PROCEDURE — 1036F TOBACCO NON-USER: CPT | Performed by: NURSE PRACTITIONER

## 2024-12-20 PROCEDURE — 1159F MED LIST DOCD IN RCRD: CPT | Performed by: NURSE PRACTITIONER

## 2024-12-20 PROCEDURE — 1160F RVW MEDS BY RX/DR IN RCRD: CPT | Performed by: NURSE PRACTITIONER

## 2024-12-20 PROCEDURE — 51798 US URINE CAPACITY MEASURE: CPT | Performed by: NURSE PRACTITIONER

## 2024-12-20 PROCEDURE — 99213 OFFICE O/P EST LOW 20 MIN: CPT | Performed by: NURSE PRACTITIONER

## 2024-12-20 RX ORDER — SILDENAFIL 100 MG/1
100 TABLET, FILM COATED ORAL AS NEEDED
Qty: 30 TABLET | Refills: 0 | Status: SHIPPED | OUTPATIENT
Start: 2024-12-20

## 2024-12-20 RX ORDER — FINASTERIDE 5 MG/1
5 TABLET, FILM COATED ORAL DAILY
Qty: 90 TABLET | Refills: 3 | Status: SHIPPED | OUTPATIENT
Start: 2024-12-20 | End: 2025-12-20

## 2024-12-20 NOTE — PROGRESS NOTES
[facility]  Outpatient Clinic Note    Subjective   Karl Lopez is a 69 y.o. male    History of Present Illness   Patient presenting to clinic today for FUV. Complaint of urinary incontinence  And testicular discomfort. History of Dementia, HTN, BPH with LUTS, Prostatitis, ED,   Chronic Back Pain and Hematuria. Today's visit patient reports that testicular   Pain has resolved. Taking Finasteride 5 mg. Stopped Tamsulosin.    Sildenafil 100 mg with good results.   No fevers, chills, n/v. Reports an increase in urinary incontinence the past few months.  Wife states that he forgets to empty bladder. Wearing briefs. He is not a good candidate for PFPT. NTF x 2, not bothersome. Feels that he empties bladder well, no UTI history. No stress incontinence. Patient denies gross hematuria, dysuria, or frequency.      I reviewed/interpreted 10/31/2024 Scrotal Ultrasound   No sign of intratesticular mass or torsion.  Small bilateral hydroceles.  Left-sided varicocele.  5 x 3 x 4 mm right epididymal head cyst.    Unable to leave urine sample today   PVR 51 ml     Lab Results   Component Value Date    PSA 0.79 11/12/2024    PSA 1.46 10/11/2021    PSA 1.16 05/20/2019     Lab Results   Component Value Date    PSA 0.79 11/12/2024    PSA 1.46 10/11/2021    PSA 1.16 05/20/2019     Past Medical History and Surgical History   No past medical history on file.  No past surgical history on file.    Medications  Current Outpatient Medications on File Prior to Visit   Medication Sig Dispense Refill    amLODIPine (Norvasc) 10 mg tablet Take 1 tablet (10 mg) by mouth once daily. 90 tablet 1    ascorbic acid (Vitamin C) 500 mg tablet Take 6 tablets (3,000 mg) by mouth once daily.      cholecalciferol (Vitamin D-3) 25 MCG (1000 UT) tablet Take 1 tablet (25 mcg) by mouth once daily.      finasteride (Proscar) 5 mg tablet Take 1 tablet (5 mg) by mouth once daily. Do not crush, chew, or split. 90 tablet 1    gabapentin (Neurontin) 100  mg capsule Take 1 capsule (100 mg) by mouth 4 times a day as needed.      metoprolol succinate XL (Toprol-XL) 100 mg 24 hr tablet Take 1 tablet (100 mg) by mouth 2 times a day. 180 tablet 2    omeprazole OTC (PriLOSEC OTC) 20 mg EC tablet Take 1 tablet (20 mg) by mouth once daily. Do not crush, chew, or split. (Patient taking differently: Take 1 tablet (20 mg) by mouth if needed. PRn) 90 tablet 1    oxyCODONE-acetaminophen (Percocet)  mg tablet TAKE 1 TABLET BY MOUTH 5 TIMES A DAY AS NEEDED      ramipril (Altace) 10 mg capsule TAKE 2 CAPSULES BY MOUTH EVERY  capsule 0    sertraline (Zoloft) 50 mg tablet Take 2 tablets (100 mg) by mouth once daily. 180 tablet 3    sildenafil (Viagra) 100 mg tablet TAKE 1 TABLET BY MOUTH PER DAY 1 HOUR BEFORE NEEDED 10 tablet 0    tamsulosin (Flomax) 0.4 mg 24 hr capsule Take 1 capsule (0.4 mg) by mouth once daily. 90 capsule 1     No current facility-administered medications on file prior to visit.       Objective   Physicial Exam  General: Well developed, well nourished, alert and cooperative, appears in no acute distress  Eyes: Non-injected conjunctiva, sclera clear, no proptosis  Cardiac: Extremities are warm and well perfused. No edema, cyanosis or pallor.   Lungs: Breathing is easy, non-labored. Speaking in clear and complete sentences. Normal diaphragmatic movement.  MSK: Ambulatory with steady gait, unassisted  Neuro: alert and oriented to person, place and time  Psych: Demonstrates good judgement and reason, without hallucinations, abnormal affect or abnormal behaviors.  Skin: no obvious lesions, no rashes.    No visits with results within 1 Day(s) from this visit.   Latest known visit with results is:   Lab on 11/12/2024   Component Date Value Ref Range Status    Glucose 11/12/2024 86  74 - 99 mg/dL Final    Sodium 11/12/2024 142  136 - 145 mmol/L Final    Potassium 11/12/2024 4.1  3.5 - 5.3 mmol/L Final    Chloride 11/12/2024 103  98 - 107 mmol/L Final     Bicarbonate 11/12/2024 32  21 - 32 mmol/L Final    Anion Gap 11/12/2024 11  10 - 20 mmol/L Final    Urea Nitrogen 11/12/2024 14  6 - 23 mg/dL Final    Creatinine 11/12/2024 0.72  0.50 - 1.30 mg/dL Final    eGFR 11/12/2024 >90  >60 mL/min/1.73m*2 Final    Calculations of estimated GFR are performed using the 2021 CKD-EPI Study Refit equation without the race variable for the IDMS-Traceable creatinine methods.  https://jasn.asnjournals.org/content/early/2021/09/22/ASN.6636047243    Calcium 11/12/2024 9.3  8.6 - 10.3 mg/dL Final    Color, Urine 11/12/2024 Yellow  Light-Yellow, Yellow, Dark-Yellow Final    Appearance, Urine 11/12/2024 Clear  Clear Final    Specific Gravity, Urine 11/12/2024 1.025  1.005 - 1.035 Final    pH, Urine 11/12/2024 7.0  5.0, 5.5, 6.0, 6.5, 7.0, 7.5, 8.0 Final    Protein, Urine 11/12/2024 NEGATIVE  NEGATIVE, 10 (TRACE), 20 (TRACE) mg/dL Final    Glucose, Urine 11/12/2024 Normal  Normal mg/dL Final    Blood, Urine 11/12/2024 NEGATIVE  NEGATIVE Final    Ketones, Urine 11/12/2024 NEGATIVE  NEGATIVE mg/dL Final    Bilirubin, Urine 11/12/2024 NEGATIVE  NEGATIVE Final    Urobilinogen, Urine 11/12/2024 4 (2+) (A)  Normal mg/dL Final    Some pigments and medications may cause a false positive urobilinogen.    Nitrite, Urine 11/12/2024 NEGATIVE  NEGATIVE Final    Leukocyte Esterase, Urine 11/12/2024 NEGATIVE  NEGATIVE Final    Prostate Specific Antigen,Screen 11/12/2024 0.79  <=4.00 ng/mL Final    Prostate Specific AG 11/12/2024 0.79  <=4.00 ng/mL Final      Review of Systems  All other systems have been reviewed and are negative for complaint.      Assessment and Plan   - Urinary incontinence  We discussed possible treatment options including doing conservative voiding techniques, medications, and surgical options. Patient was counseled regarding bladder retraining, diet choices, and fluid restriction.     Patient was informed that first line treatment is behavioral therapy. This includes:   -Fluid  balancing and sometimes restriction   -Reducing caffeine or other bladder stimulants   -Bladder retraining  -Avoid constipation  -Avoid activities that exacerbate incontinence  -Kegel exercises and pelvic floor muscle training  He is not a good candidate for PFPT     - Chronic testicular pain   Pain resolved. Scrotal Ultrasound unremarkable     RTC yearly, sooner if needed     All questions and concerns were addressed. Patient verbalizes understanding and has no other questions at this time.   You are able to have email access to your chart. You can sign into [employer] or add the [employer] kurtis on your smart phone to review today's visit, laboratory work and imaging.     Ruth Van-- TIMOTHY NICK  Office Phone:  [phone]

## 2024-12-23 DIAGNOSIS — I10 BENIGN ESSENTIAL HYPERTENSION: ICD-10-CM

## 2024-12-23 RX ORDER — RAMIPRIL 10 MG/1
20 CAPSULE ORAL DAILY
Qty: 180 CAPSULE | Refills: 0 | Status: SHIPPED | OUTPATIENT
Start: 2024-12-23

## 2024-12-30 ENCOUNTER — APPOINTMENT (OUTPATIENT)
Dept: RADIOLOGY | Facility: HOSPITAL | Age: 69
DRG: 884 | End: 2024-12-30
Payer: MEDICARE

## 2024-12-30 ENCOUNTER — HOSPITAL ENCOUNTER (EMERGENCY)
Facility: HOSPITAL | Age: 69
Discharge: HOME | DRG: 884 | End: 2024-12-31
Payer: MEDICARE

## 2024-12-30 ENCOUNTER — APPOINTMENT (OUTPATIENT)
Dept: CARDIOLOGY | Facility: HOSPITAL | Age: 69
DRG: 884 | End: 2024-12-30
Payer: MEDICARE

## 2024-12-30 VITALS
SYSTOLIC BLOOD PRESSURE: 156 MMHG | HEART RATE: 78 BPM | HEIGHT: 70 IN | WEIGHT: 175 LBS | TEMPERATURE: 98.8 F | DIASTOLIC BLOOD PRESSURE: 98 MMHG | BODY MASS INDEX: 25.05 KG/M2 | OXYGEN SATURATION: 99 % | RESPIRATION RATE: 17 BRPM

## 2024-12-30 LAB
ALBUMIN SERPL BCP-MCNC: 4.3 G/DL (ref 3.4–5)
ALP SERPL-CCNC: 83 U/L (ref 33–136)
ALT SERPL W P-5'-P-CCNC: 17 U/L (ref 10–52)
ANION GAP SERPL CALCULATED.3IONS-SCNC: 9 MMOL/L (ref 10–20)
AST SERPL W P-5'-P-CCNC: 31 U/L (ref 9–39)
BASOPHILS # BLD AUTO: 0.04 X10*3/UL (ref 0–0.1)
BASOPHILS NFR BLD AUTO: 0.5 %
BILIRUB SERPL-MCNC: 0.7 MG/DL (ref 0–1.2)
BUN SERPL-MCNC: 11 MG/DL (ref 6–23)
CALCIUM SERPL-MCNC: 9.2 MG/DL (ref 8.6–10.3)
CHLORIDE SERPL-SCNC: 105 MMOL/L (ref 98–107)
CO2 SERPL-SCNC: 29 MMOL/L (ref 21–32)
CREAT SERPL-MCNC: 0.76 MG/DL (ref 0.5–1.3)
EGFRCR SERPLBLD CKD-EPI 2021: >90 ML/MIN/1.73M*2
EOSINOPHIL # BLD AUTO: 0.16 X10*3/UL (ref 0–0.7)
EOSINOPHIL NFR BLD AUTO: 2 %
ERYTHROCYTE [DISTWIDTH] IN BLOOD BY AUTOMATED COUNT: 13.2 % (ref 11.5–14.5)
GLUCOSE SERPL-MCNC: 91 MG/DL (ref 74–99)
HCT VFR BLD AUTO: 46.4 % (ref 41–52)
HGB BLD-MCNC: 16.1 G/DL (ref 13.5–17.5)
IMM GRANULOCYTES # BLD AUTO: 0.04 X10*3/UL (ref 0–0.7)
IMM GRANULOCYTES NFR BLD AUTO: 0.5 % (ref 0–0.9)
LYMPHOCYTES # BLD AUTO: 1.23 X10*3/UL (ref 1.2–4.8)
LYMPHOCYTES NFR BLD AUTO: 15.1 %
MAGNESIUM SERPL-MCNC: 2.11 MG/DL (ref 1.6–2.4)
MCH RBC QN AUTO: 29.8 PG (ref 26–34)
MCHC RBC AUTO-ENTMCNC: 34.7 G/DL (ref 32–36)
MCV RBC AUTO: 86 FL (ref 80–100)
MONOCYTES # BLD AUTO: 0.62 X10*3/UL (ref 0.1–1)
MONOCYTES NFR BLD AUTO: 7.6 %
NEUTROPHILS # BLD AUTO: 6.04 X10*3/UL (ref 1.2–7.7)
NEUTROPHILS NFR BLD AUTO: 74.3 %
NRBC BLD-RTO: 0 /100 WBCS (ref 0–0)
PLATELET # BLD AUTO: 197 X10*3/UL (ref 150–450)
POTASSIUM SERPL-SCNC: 3.8 MMOL/L (ref 3.5–5.3)
PROT SERPL-MCNC: 7.1 G/DL (ref 6.4–8.2)
RBC # BLD AUTO: 5.4 X10*6/UL (ref 4.5–5.9)
SODIUM SERPL-SCNC: 139 MMOL/L (ref 136–145)
WBC # BLD AUTO: 8.1 X10*3/UL (ref 4.4–11.3)

## 2024-12-30 PROCEDURE — 96375 TX/PRO/DX INJ NEW DRUG ADDON: CPT

## 2024-12-30 PROCEDURE — 80053 COMPREHEN METABOLIC PANEL: CPT

## 2024-12-30 PROCEDURE — 2500000004 HC RX 250 GENERAL PHARMACY W/ HCPCS (ALT 636 FOR OP/ED)

## 2024-12-30 PROCEDURE — 96374 THER/PROPH/DIAG INJ IV PUSH: CPT

## 2024-12-30 PROCEDURE — 99285 EMERGENCY DEPT VISIT HI MDM: CPT | Mod: 25

## 2024-12-30 PROCEDURE — 36415 COLL VENOUS BLD VENIPUNCTURE: CPT

## 2024-12-30 PROCEDURE — 83735 ASSAY OF MAGNESIUM: CPT

## 2024-12-30 PROCEDURE — 85025 COMPLETE CBC W/AUTO DIFF WBC: CPT

## 2024-12-30 PROCEDURE — 93005 ELECTROCARDIOGRAM TRACING: CPT

## 2024-12-30 PROCEDURE — 72100 X-RAY EXAM L-S SPINE 2/3 VWS: CPT | Performed by: RADIOLOGY

## 2024-12-30 PROCEDURE — 72100 X-RAY EXAM L-S SPINE 2/3 VWS: CPT

## 2024-12-30 RX ORDER — MORPHINE SULFATE 4 MG/ML
4 INJECTION, SOLUTION INTRAMUSCULAR; INTRAVENOUS ONCE
Status: COMPLETED | OUTPATIENT
Start: 2024-12-30 | End: 2024-12-30

## 2024-12-30 RX ORDER — ONDANSETRON HYDROCHLORIDE 2 MG/ML
4 INJECTION, SOLUTION INTRAVENOUS ONCE
Status: COMPLETED | OUTPATIENT
Start: 2024-12-30 | End: 2024-12-30

## 2024-12-30 RX ADMIN — ONDANSETRON 4 MG: 2 INJECTION, SOLUTION INTRAMUSCULAR; INTRAVENOUS at 18:51

## 2024-12-30 RX ADMIN — MORPHINE SULFATE 4 MG: 4 INJECTION, SOLUTION INTRAMUSCULAR; INTRAVENOUS at 18:52

## 2024-12-30 ASSESSMENT — LIFESTYLE VARIABLES
EVER FELT BAD OR GUILTY ABOUT YOUR DRINKING: NO
HAVE PEOPLE ANNOYED YOU BY CRITICIZING YOUR DRINKING: NO
TOTAL SCORE: 0
EVER HAD A DRINK FIRST THING IN THE MORNING TO STEADY YOUR NERVES TO GET RID OF A HANGOVER: NO
HAVE YOU EVER FELT YOU SHOULD CUT DOWN ON YOUR DRINKING: NO

## 2024-12-30 ASSESSMENT — PAIN DESCRIPTION - ORIENTATION: ORIENTATION: MID

## 2024-12-30 ASSESSMENT — PAIN SCALES - GENERAL
PAINLEVEL_OUTOF10: 10 - WORST POSSIBLE PAIN
PAINLEVEL_OUTOF10: 9

## 2024-12-30 ASSESSMENT — PAIN DESCRIPTION - PAIN TYPE: TYPE: ACUTE PAIN

## 2024-12-30 ASSESSMENT — PAIN - FUNCTIONAL ASSESSMENT: PAIN_FUNCTIONAL_ASSESSMENT: 0-10

## 2024-12-30 ASSESSMENT — PAIN DESCRIPTION - LOCATION: LOCATION: BACK

## 2024-12-30 NOTE — ED TRIAGE NOTES
TRIAGE NOTE   I saw the patient as the Clinician in Triage and performed a brief history and physical exam, established acuity, and ordered appropriate tests to develop basic plan of care. Patient will be seen by an CLIVE, resident and/or physician who will independently evaluate the patient. Please see subsequent provider notes for further details and disposition.     Brief HPI: In brief, Karl Lopez is a 69 y.o. male that presents for chronic low back pain and difficulty caring for himself at home.  Patient's family is at bedside providing history as patient has a history of dementia.  Patient states he has had multiple falls over the past few weeks.  He states he has had at least 1 fall per week.  He states he has had increased pain in his lower back.  Nothing makes the pain better or worse for the back.  Patient is unable to stand or walk independently.  He is unable to be adequately cared for at home and therefore they are seeking skilled nursing facility placement for the patient.     Focused Physical exam:   GENERAL APPEARANCE: Awake and alert.  Disheveled appearing.    VITAL SIGNS: As per the nurses' triage record.    HEENT: Normocephalic, atraumatic. Extraocular muscles are intact. Pupils equal round and reactive to light.     CHEST: Clear to auscultation bilaterally.     HEART: Regular rate and rhythm.     MUSCULOSKELETAL: Moving all extremities     NEUROLOGICAL: Awake, alert and oriented x 3.     DERM: Warm and dry.  No rashes.      Plan/MDM:   Diagnostic labs including x-ray of the lower back.  Plan to admit for SNF placement.  Please see subsequent provider note for further details and disposition

## 2024-12-30 NOTE — ED TRIAGE NOTES
Arrived via EMS from home with c/o chronic back pain x40 years. Seen by pain management on 12/26.

## 2024-12-31 LAB
ATRIAL RATE: 71 BPM
P AXIS: 45 DEGREES
P OFFSET: 187 MS
P ONSET: 140 MS
PR INTERVAL: 150 MS
Q ONSET: 215 MS
QRS COUNT: 12 BEATS
QRS DURATION: 72 MS
QT INTERVAL: 388 MS
QTC CALCULATION(BAZETT): 421 MS
QTC FREDERICIA: 410 MS
R AXIS: -5 DEGREES
T AXIS: 16 DEGREES
T OFFSET: 409 MS
VENTRICULAR RATE: 71 BPM

## 2025-01-02 ENCOUNTER — TELEPHONE (OUTPATIENT)
Dept: PRIMARY CARE | Facility: CLINIC | Age: 70
End: 2025-01-02

## 2025-01-02 ENCOUNTER — APPOINTMENT (OUTPATIENT)
Dept: PRIMARY CARE | Facility: CLINIC | Age: 70
End: 2025-01-02
Payer: MEDICARE

## 2025-01-02 ENCOUNTER — APPOINTMENT (OUTPATIENT)
Dept: CARDIOLOGY | Facility: HOSPITAL | Age: 70
DRG: 884 | End: 2025-01-02
Payer: MEDICARE

## 2025-01-02 ENCOUNTER — HOSPITAL ENCOUNTER (INPATIENT)
Facility: HOSPITAL | Age: 70
LOS: 1 days | Discharge: NURSING, REHAB OR LONG-TERM CARE | DRG: 884 | End: 2025-01-03
Attending: STUDENT IN AN ORGANIZED HEALTH CARE EDUCATION/TRAINING PROGRAM | Admitting: STUDENT IN AN ORGANIZED HEALTH CARE EDUCATION/TRAINING PROGRAM
Payer: MEDICARE

## 2025-01-02 ENCOUNTER — APPOINTMENT (OUTPATIENT)
Dept: RADIOLOGY | Facility: HOSPITAL | Age: 70
DRG: 884 | End: 2025-01-02
Payer: MEDICARE

## 2025-01-02 DIAGNOSIS — E87.6 HYPOKALEMIA: Primary | ICD-10-CM

## 2025-01-02 DIAGNOSIS — R29.6 RECURRENT FALLS: ICD-10-CM

## 2025-01-02 DIAGNOSIS — G89.29 OTHER CHRONIC PAIN: ICD-10-CM

## 2025-01-02 DIAGNOSIS — R53.1 WEAKNESS: ICD-10-CM

## 2025-01-02 PROBLEM — N40.0 BPH (BENIGN PROSTATIC HYPERPLASIA): Status: ACTIVE | Noted: 2025-01-02

## 2025-01-02 PROBLEM — R41.89 COGNITIVE IMPAIRMENT: Status: ACTIVE | Noted: 2024-01-04

## 2025-01-02 LAB
ALBUMIN SERPL BCP-MCNC: 3.7 G/DL (ref 3.4–5)
ALP SERPL-CCNC: 72 U/L (ref 33–136)
ALT SERPL W P-5'-P-CCNC: 20 U/L (ref 10–52)
ANION GAP SERPL CALCULATED.3IONS-SCNC: 9 MMOL/L (ref 10–20)
APPEARANCE UR: CLEAR
AST SERPL W P-5'-P-CCNC: 43 U/L (ref 9–39)
ATRIAL RATE: 64 BPM
BASOPHILS # BLD AUTO: 0.04 X10*3/UL (ref 0–0.1)
BASOPHILS NFR BLD AUTO: 0.6 %
BILIRUB SERPL-MCNC: 0.8 MG/DL (ref 0–1.2)
BILIRUB UR STRIP.AUTO-MCNC: NEGATIVE MG/DL
BUN SERPL-MCNC: 11 MG/DL (ref 6–23)
CALCIUM SERPL-MCNC: 8.5 MG/DL (ref 8.6–10.3)
CARDIAC TROPONIN I PNL SERPL HS: 9 NG/L (ref 0–20)
CARDIAC TROPONIN I PNL SERPL HS: 9 NG/L (ref 0–20)
CHLORIDE SERPL-SCNC: 107 MMOL/L (ref 98–107)
CO2 SERPL-SCNC: 29 MMOL/L (ref 21–32)
COLOR UR: YELLOW
CREAT SERPL-MCNC: 0.69 MG/DL (ref 0.5–1.3)
EGFRCR SERPLBLD CKD-EPI 2021: >90 ML/MIN/1.73M*2
EOSINOPHIL # BLD AUTO: 0.19 X10*3/UL (ref 0–0.7)
EOSINOPHIL NFR BLD AUTO: 2.8 %
ERYTHROCYTE [DISTWIDTH] IN BLOOD BY AUTOMATED COUNT: 13.1 % (ref 11.5–14.5)
GLUCOSE SERPL-MCNC: 94 MG/DL (ref 74–99)
GLUCOSE UR STRIP.AUTO-MCNC: NORMAL MG/DL
HCT VFR BLD AUTO: 45.1 % (ref 41–52)
HGB BLD-MCNC: 15.2 G/DL (ref 13.5–17.5)
IMM GRANULOCYTES # BLD AUTO: 0.04 X10*3/UL (ref 0–0.7)
IMM GRANULOCYTES NFR BLD AUTO: 0.6 % (ref 0–0.9)
KETONES UR STRIP.AUTO-MCNC: ABNORMAL MG/DL
LEUKOCYTE ESTERASE UR QL STRIP.AUTO: NEGATIVE
LYMPHOCYTES # BLD AUTO: 1.44 X10*3/UL (ref 1.2–4.8)
LYMPHOCYTES NFR BLD AUTO: 21.5 %
MAGNESIUM SERPL-MCNC: 2.13 MG/DL (ref 1.6–2.4)
MCH RBC QN AUTO: 29.2 PG (ref 26–34)
MCHC RBC AUTO-ENTMCNC: 33.7 G/DL (ref 32–36)
MCV RBC AUTO: 87 FL (ref 80–100)
MONOCYTES # BLD AUTO: 0.65 X10*3/UL (ref 0.1–1)
MONOCYTES NFR BLD AUTO: 9.7 %
NEUTROPHILS # BLD AUTO: 4.34 X10*3/UL (ref 1.2–7.7)
NEUTROPHILS NFR BLD AUTO: 64.8 %
NITRITE UR QL STRIP.AUTO: NEGATIVE
NRBC BLD-RTO: 0 /100 WBCS (ref 0–0)
P AXIS: 48 DEGREES
P OFFSET: 196 MS
P ONSET: 144 MS
PH UR STRIP.AUTO: 6 [PH]
PLATELET # BLD AUTO: 182 X10*3/UL (ref 150–450)
POTASSIUM SERPL-SCNC: 3.2 MMOL/L (ref 3.5–5.3)
PR INTERVAL: 162 MS
PROT SERPL-MCNC: 6.1 G/DL (ref 6.4–8.2)
PROT UR STRIP.AUTO-MCNC: NEGATIVE MG/DL
Q ONSET: 225 MS
QRS COUNT: 10 BEATS
QRS DURATION: 74 MS
QT INTERVAL: 414 MS
QTC CALCULATION(BAZETT): 427 MS
QTC FREDERICIA: 422 MS
R AXIS: -1 DEGREES
RBC # BLD AUTO: 5.21 X10*6/UL (ref 4.5–5.9)
RBC # UR STRIP.AUTO: NEGATIVE /UL
SODIUM SERPL-SCNC: 142 MMOL/L (ref 136–145)
SP GR UR STRIP.AUTO: 1.02
T AXIS: 10 DEGREES
T OFFSET: 432 MS
UROBILINOGEN UR STRIP.AUTO-MCNC: ABNORMAL MG/DL
VENTRICULAR RATE: 64 BPM
WBC # BLD AUTO: 6.7 X10*3/UL (ref 4.4–11.3)

## 2025-01-02 PROCEDURE — G0378 HOSPITAL OBSERVATION PER HR: HCPCS

## 2025-01-02 PROCEDURE — 80053 COMPREHEN METABOLIC PANEL: CPT | Performed by: PHYSICIAN ASSISTANT

## 2025-01-02 PROCEDURE — 83735 ASSAY OF MAGNESIUM: CPT | Performed by: PHYSICIAN ASSISTANT

## 2025-01-02 PROCEDURE — 72131 CT LUMBAR SPINE W/O DYE: CPT | Performed by: RADIOLOGY

## 2025-01-02 PROCEDURE — 85025 COMPLETE CBC W/AUTO DIFF WBC: CPT | Performed by: PHYSICIAN ASSISTANT

## 2025-01-02 PROCEDURE — 1210000001 HC SEMI-PRIVATE ROOM DAILY

## 2025-01-02 PROCEDURE — 84484 ASSAY OF TROPONIN QUANT: CPT | Performed by: PHYSICIAN ASSISTANT

## 2025-01-02 PROCEDURE — 2500000002 HC RX 250 W HCPCS SELF ADMINISTERED DRUGS (ALT 637 FOR MEDICARE OP, ALT 636 FOR OP/ED): Performed by: PHYSICIAN ASSISTANT

## 2025-01-02 PROCEDURE — 70450 CT HEAD/BRAIN W/O DYE: CPT | Performed by: RADIOLOGY

## 2025-01-02 PROCEDURE — 36415 COLL VENOUS BLD VENIPUNCTURE: CPT | Performed by: PHYSICIAN ASSISTANT

## 2025-01-02 PROCEDURE — 72125 CT NECK SPINE W/O DYE: CPT | Performed by: RADIOLOGY

## 2025-01-02 PROCEDURE — 99223 1ST HOSP IP/OBS HIGH 75: CPT | Performed by: STUDENT IN AN ORGANIZED HEALTH CARE EDUCATION/TRAINING PROGRAM

## 2025-01-02 PROCEDURE — 93005 ELECTROCARDIOGRAM TRACING: CPT

## 2025-01-02 PROCEDURE — 99285 EMERGENCY DEPT VISIT HI MDM: CPT | Mod: 25

## 2025-01-02 PROCEDURE — 2500000002 HC RX 250 W HCPCS SELF ADMINISTERED DRUGS (ALT 637 FOR MEDICARE OP, ALT 636 FOR OP/ED): Performed by: STUDENT IN AN ORGANIZED HEALTH CARE EDUCATION/TRAINING PROGRAM

## 2025-01-02 PROCEDURE — 2500000001 HC RX 250 WO HCPCS SELF ADMINISTERED DRUGS (ALT 637 FOR MEDICARE OP): Performed by: STUDENT IN AN ORGANIZED HEALTH CARE EDUCATION/TRAINING PROGRAM

## 2025-01-02 PROCEDURE — 2500000004 HC RX 250 GENERAL PHARMACY W/ HCPCS (ALT 636 FOR OP/ED): Performed by: STUDENT IN AN ORGANIZED HEALTH CARE EDUCATION/TRAINING PROGRAM

## 2025-01-02 PROCEDURE — 70450 CT HEAD/BRAIN W/O DYE: CPT

## 2025-01-02 PROCEDURE — 72125 CT NECK SPINE W/O DYE: CPT

## 2025-01-02 PROCEDURE — 81003 URINALYSIS AUTO W/O SCOPE: CPT | Performed by: PHYSICIAN ASSISTANT

## 2025-01-02 PROCEDURE — 72131 CT LUMBAR SPINE W/O DYE: CPT

## 2025-01-02 RX ORDER — GABAPENTIN 100 MG/1
100 CAPSULE ORAL 4 TIMES DAILY PRN
Status: DISCONTINUED | OUTPATIENT
Start: 2025-01-02 | End: 2025-01-03 | Stop reason: HOSPADM

## 2025-01-02 RX ORDER — SERTRALINE HYDROCHLORIDE 100 MG/1
100 TABLET, FILM COATED ORAL DAILY
Status: DISCONTINUED | OUTPATIENT
Start: 2025-01-02 | End: 2025-01-03 | Stop reason: HOSPADM

## 2025-01-02 RX ORDER — OXYCODONE AND ACETAMINOPHEN 10; 325 MG/1; MG/1
1 TABLET ORAL EVERY 6 HOURS PRN
Status: DISCONTINUED | OUTPATIENT
Start: 2025-01-02 | End: 2025-01-03 | Stop reason: HOSPADM

## 2025-01-02 RX ORDER — AMLODIPINE BESYLATE 10 MG/1
10 TABLET ORAL DAILY
Status: DISCONTINUED | OUTPATIENT
Start: 2025-01-02 | End: 2025-01-03 | Stop reason: HOSPADM

## 2025-01-02 RX ORDER — ACETAMINOPHEN 160 MG/5ML
650 SOLUTION ORAL EVERY 4 HOURS PRN
Status: DISCONTINUED | OUTPATIENT
Start: 2025-01-02 | End: 2025-01-03 | Stop reason: HOSPADM

## 2025-01-02 RX ORDER — FINASTERIDE 5 MG/1
5 TABLET, FILM COATED ORAL DAILY
Status: DISCONTINUED | OUTPATIENT
Start: 2025-01-02 | End: 2025-01-03 | Stop reason: HOSPADM

## 2025-01-02 RX ORDER — ACETAMINOPHEN 650 MG/1
650 SUPPOSITORY RECTAL EVERY 4 HOURS PRN
Status: DISCONTINUED | OUTPATIENT
Start: 2025-01-02 | End: 2025-01-03 | Stop reason: HOSPADM

## 2025-01-02 RX ORDER — ENOXAPARIN SODIUM 100 MG/ML
40 INJECTION SUBCUTANEOUS DAILY
Status: DISCONTINUED | OUTPATIENT
Start: 2025-01-02 | End: 2025-01-03 | Stop reason: HOSPADM

## 2025-01-02 RX ORDER — ACETAMINOPHEN 325 MG/1
650 TABLET ORAL EVERY 4 HOURS PRN
Status: DISCONTINUED | OUTPATIENT
Start: 2025-01-02 | End: 2025-01-03 | Stop reason: HOSPADM

## 2025-01-02 RX ORDER — POLYETHYLENE GLYCOL 3350 17 G/17G
17 POWDER, FOR SOLUTION ORAL DAILY
Status: DISCONTINUED | OUTPATIENT
Start: 2025-01-02 | End: 2025-01-03 | Stop reason: HOSPADM

## 2025-01-02 RX ORDER — METOPROLOL SUCCINATE 100 MG/1
100 TABLET, EXTENDED RELEASE ORAL 2 TIMES DAILY
Status: DISCONTINUED | OUTPATIENT
Start: 2025-01-02 | End: 2025-01-03 | Stop reason: HOSPADM

## 2025-01-02 RX ORDER — LISINOPRIL 40 MG/1
40 TABLET ORAL DAILY
Status: DISCONTINUED | OUTPATIENT
Start: 2025-01-02 | End: 2025-01-03 | Stop reason: HOSPADM

## 2025-01-02 RX ORDER — POTASSIUM CHLORIDE 20 MEQ/1
40 TABLET, EXTENDED RELEASE ORAL ONCE
Status: COMPLETED | OUTPATIENT
Start: 2025-01-02 | End: 2025-01-02

## 2025-01-02 RX ORDER — ONDANSETRON 4 MG/1
4 TABLET, ORALLY DISINTEGRATING ORAL EVERY 8 HOURS PRN
Status: DISCONTINUED | OUTPATIENT
Start: 2025-01-02 | End: 2025-01-03 | Stop reason: HOSPADM

## 2025-01-02 RX ORDER — ONDANSETRON HYDROCHLORIDE 2 MG/ML
4 INJECTION, SOLUTION INTRAVENOUS EVERY 8 HOURS PRN
Status: DISCONTINUED | OUTPATIENT
Start: 2025-01-02 | End: 2025-01-03 | Stop reason: HOSPADM

## 2025-01-02 RX ADMIN — METOPROLOL SUCCINATE 100 MG: 100 TABLET, EXTENDED RELEASE ORAL at 22:25

## 2025-01-02 RX ADMIN — POLYETHYLENE GLYCOL 3350 17 G: 17 POWDER, FOR SOLUTION ORAL at 16:48

## 2025-01-02 RX ADMIN — ENOXAPARIN SODIUM 40 MG: 40 INJECTION SUBCUTANEOUS at 16:48

## 2025-01-02 RX ADMIN — POTASSIUM CHLORIDE 40 MEQ: 1500 TABLET, EXTENDED RELEASE ORAL at 13:45

## 2025-01-02 RX ADMIN — AMLODIPINE BESYLATE 10 MG: 10 TABLET ORAL at 16:48

## 2025-01-02 RX ADMIN — FINASTERIDE 5 MG: 5 TABLET, FILM COATED ORAL at 16:48

## 2025-01-02 RX ADMIN — SERTRALINE 100 MG: 100 TABLET, FILM COATED ORAL at 16:48

## 2025-01-02 RX ADMIN — OXYCODONE AND ACETAMINOPHEN 1 TABLET: 10; 325 TABLET ORAL at 16:54

## 2025-01-02 SDOH — HEALTH STABILITY: PHYSICAL HEALTH: ON AVERAGE, HOW MANY DAYS PER WEEK DO YOU ENGAGE IN MODERATE TO STRENUOUS EXERCISE (LIKE A BRISK WALK)?: 0 DAYS

## 2025-01-02 SDOH — SOCIAL STABILITY: SOCIAL INSECURITY: ABUSE: ADULT

## 2025-01-02 SDOH — SOCIAL STABILITY: SOCIAL INSECURITY: ARE YOU OR HAVE YOU BEEN THREATENED OR ABUSED PHYSICALLY, EMOTIONALLY, OR SEXUALLY BY ANYONE?: NO

## 2025-01-02 SDOH — SOCIAL STABILITY: SOCIAL INSECURITY: HAS ANYONE EVER THREATENED TO HURT YOUR FAMILY OR YOUR PETS?: NO

## 2025-01-02 SDOH — HEALTH STABILITY: PHYSICAL HEALTH: ON AVERAGE, HOW MANY MINUTES DO YOU ENGAGE IN EXERCISE AT THIS LEVEL?: 0 MIN

## 2025-01-02 SDOH — SOCIAL STABILITY: SOCIAL INSECURITY: DO YOU FEEL ANYONE HAS EXPLOITED OR TAKEN ADVANTAGE OF YOU FINANCIALLY OR OF YOUR PERSONAL PROPERTY?: NO

## 2025-01-02 SDOH — HEALTH STABILITY: PHYSICAL HEALTH
HOW OFTEN DO YOU NEED TO HAVE SOMEONE HELP YOU WHEN YOU READ INSTRUCTIONS, PAMPHLETS, OR OTHER WRITTEN MATERIAL FROM YOUR DOCTOR OR PHARMACY?: ALWAYS

## 2025-01-02 SDOH — SOCIAL STABILITY: SOCIAL INSECURITY: DO YOU FEEL UNSAFE GOING BACK TO THE PLACE WHERE YOU ARE LIVING?: NO

## 2025-01-02 SDOH — SOCIAL STABILITY: SOCIAL INSECURITY: ARE THERE ANY APPARENT SIGNS OF INJURIES/BEHAVIORS THAT COULD BE RELATED TO ABUSE/NEGLECT?: NO

## 2025-01-02 SDOH — SOCIAL STABILITY: SOCIAL INSECURITY: HAVE YOU HAD THOUGHTS OF HARMING ANYONE ELSE?: NO

## 2025-01-02 SDOH — SOCIAL STABILITY: SOCIAL INSECURITY: HAVE YOU HAD ANY THOUGHTS OF HARMING ANYONE ELSE?: NO

## 2025-01-02 SDOH — SOCIAL STABILITY: SOCIAL INSECURITY: DOES ANYONE TRY TO KEEP YOU FROM HAVING/CONTACTING OTHER FRIENDS OR DOING THINGS OUTSIDE YOUR HOME?: NO

## 2025-01-02 SDOH — SOCIAL STABILITY: SOCIAL INSECURITY: WERE YOU ABLE TO COMPLETE ALL THE BEHAVIORAL HEALTH SCREENINGS?: YES

## 2025-01-02 ASSESSMENT — PAIN - FUNCTIONAL ASSESSMENT: PAIN_FUNCTIONAL_ASSESSMENT: 0-10

## 2025-01-02 ASSESSMENT — LIFESTYLE VARIABLES
HOW OFTEN DO YOU HAVE 6 OR MORE DRINKS ON ONE OCCASION: NEVER
HOW MANY STANDARD DRINKS CONTAINING ALCOHOL DO YOU HAVE ON A TYPICAL DAY: PATIENT DOES NOT DRINK
AUDIT-C TOTAL SCORE: 0
HOW OFTEN DO YOU HAVE A DRINK CONTAINING ALCOHOL: NEVER
AUDIT-C TOTAL SCORE: 0
SKIP TO QUESTIONS 9-10: 1

## 2025-01-02 ASSESSMENT — ACTIVITIES OF DAILY LIVING (ADL)
HEARING - LEFT EAR: FUNCTIONAL
ADEQUATE_TO_COMPLETE_ADL: NO
PATIENT'S MEMORY ADEQUATE TO SAFELY COMPLETE DAILY ACTIVITIES?: NO
WALKS IN HOME: NEEDS ASSISTANCE
DRESSING YOURSELF: NEEDS ASSISTANCE
TOILETING: NEEDS ASSISTANCE
GROOMING: NEEDS ASSISTANCE
BATHING: NEEDS ASSISTANCE
ASSISTIVE_DEVICE: WALKER
FEEDING YOURSELF: INDEPENDENT
HEARING - RIGHT EAR: FUNCTIONAL
JUDGMENT_ADEQUATE_SAFELY_COMPLETE_DAILY_ACTIVITIES: YES

## 2025-01-02 ASSESSMENT — COGNITIVE AND FUNCTIONAL STATUS - GENERAL
DRESSING REGULAR LOWER BODY CLOTHING: A LOT
STANDING UP FROM CHAIR USING ARMS: A LITTLE
WALKING IN HOSPITAL ROOM: A LOT
MOVING FROM LYING ON BACK TO SITTING ON SIDE OF FLAT BED WITH BEDRAILS: A LOT
TOILETING: A LITTLE
PATIENT BASELINE BEDBOUND: NO
CLIMB 3 TO 5 STEPS WITH RAILING: A LOT
DAILY ACTIVITIY SCORE: 19
TURNING FROM BACK TO SIDE WHILE IN FLAT BAD: A LOT
DRESSING REGULAR UPPER BODY CLOTHING: A LITTLE
MOVING TO AND FROM BED TO CHAIR: A LOT
HELP NEEDED FOR BATHING: A LITTLE
MOBILITY SCORE: 13

## 2025-01-02 ASSESSMENT — PAIN DESCRIPTION - ORIENTATION: ORIENTATION: RIGHT

## 2025-01-02 ASSESSMENT — PAIN DESCRIPTION - PAIN TYPE: TYPE: ACUTE PAIN

## 2025-01-02 ASSESSMENT — ENCOUNTER SYMPTOMS
DYSURIA: 0
FEVER: 0
VOMITING: 0
TROUBLE SWALLOWING: 0
DIARRHEA: 0
NAUSEA: 0
SHORTNESS OF BREATH: 0
WEAKNESS: 1
COUGH: 0
CHILLS: 0
CONFUSION: 0
CONSTIPATION: 0
DIFFICULTY URINATING: 0
ABDOMINAL PAIN: 0

## 2025-01-02 ASSESSMENT — PATIENT HEALTH QUESTIONNAIRE - PHQ9
2. FEELING DOWN, DEPRESSED OR HOPELESS: NOT AT ALL
1. LITTLE INTEREST OR PLEASURE IN DOING THINGS: SEVERAL DAYS
SUM OF ALL RESPONSES TO PHQ9 QUESTIONS 1 & 2: 1

## 2025-01-02 ASSESSMENT — PAIN SCALES - GENERAL
PAINLEVEL_OUTOF10: 9
PAINLEVEL_OUTOF10: 4

## 2025-01-02 ASSESSMENT — PAIN DESCRIPTION - DESCRIPTORS: DESCRIPTORS: SHARP

## 2025-01-02 ASSESSMENT — COLUMBIA-SUICIDE SEVERITY RATING SCALE - C-SSRS
2. HAVE YOU ACTUALLY HAD ANY THOUGHTS OF KILLING YOURSELF?: NO
1. IN THE PAST MONTH, HAVE YOU WISHED YOU WERE DEAD OR WISHED YOU COULD GO TO SLEEP AND NOT WAKE UP?: NO
6. HAVE YOU EVER DONE ANYTHING, STARTED TO DO ANYTHING, OR PREPARED TO DO ANYTHING TO END YOUR LIFE?: NO

## 2025-01-02 ASSESSMENT — PAIN DESCRIPTION - LOCATION: LOCATION: BACK

## 2025-01-02 NOTE — ED TRIAGE NOTES
Pt from home via EMS, Pts wife sts he fell from bed about 4 hours ago and is now having right sided pain. Pt has been having a hard time getting around recently and sts he fell about 4 time within the past 2 days. Pt has a history of spinal stenosis , dementia and hypertension. /69.

## 2025-01-02 NOTE — H&P
History Of Present Illness  Karl Lopez is a 69 y.o. male hx of BPH, HTN, spinal stenosis, cognitive impairment, progressive supranuclear palsy presenting with frequent falls. States he has had 4 falls in the last 2 days. Most of the time the falls are mechanical due to slipping or tripping on things and generalized weakness but does endorse some intermittent dizziness. States he has had progressive weakness ongoing for the last several years. He lives at home with his wife and is having difficulty caring for himself. Denies recent illness and sick contacts. Denies fever, chills, SOB, CP, abdominal pain, nausea/vomiting, dysuria, difficulty urinating, constipation, and diarrhea. Denies tobacco use and illicit substance use. Reports drinking 1 beer a night. Denies hx of alcohol withdrawal.     In the ED. VSS. Labs showing K 3.2, ALT 20, AST 43, otherwise wnl. UA noninfectious. CT head/cervical spine/lumbar spine showing chronic degenerative changes. Patient agreeable to SNF placement.      Past Medical History  He has no past medical history on file.    Surgical History  He has no past surgical history on file.     Social History  He reports that he has quit smoking. His smoking use included cigarettes. He has never used smokeless tobacco. He reports that he does not currently use alcohol. He reports that he does not use drugs.    Family History  No family history on file.     Allergies  Latex, Talwin compound, Nsaids (non-steroidal anti-inflammatory drug), and Pentazocine    Review of Systems   Constitutional:  Negative for chills and fever.   HENT:  Negative for congestion and trouble swallowing.    Respiratory:  Negative for cough and shortness of breath.    Cardiovascular:  Negative for chest pain.   Gastrointestinal:  Negative for abdominal pain, constipation, diarrhea, nausea and vomiting.   Genitourinary:  Negative for difficulty urinating and dysuria.   Musculoskeletal:  Positive for gait problem.    Neurological:  Positive for weakness.   Psychiatric/Behavioral:  Negative for confusion.         Physical Exam  Constitutional:       General: He is not in acute distress.     Appearance: He is not toxic-appearing.      Comments: Sleeping but awakened easily to voice. Tired appearing   HENT:      Head: Normocephalic.      Mouth/Throat:      Pharynx: Oropharynx is clear.   Eyes:      General: No scleral icterus.  Cardiovascular:      Rate and Rhythm: Normal rate.   Pulmonary:      Effort: No respiratory distress.      Breath sounds: No wheezing.   Abdominal:      General: There is no distension.      Tenderness: There is no abdominal tenderness.   Musculoskeletal:      Comments: Large bruise on left lateral chest wall under the axilla   Neurological:      Mental Status: He is alert and oriented to person, place, and time.          Last Recorded Vitals  /78 (BP Location: Left arm, Patient Position: Lying)   Pulse 71   Temp 36.3 °C (97.4 °F) (Oral)   Resp 18   Wt 81.8 kg (180 lb 5.4 oz)   SpO2 97%     Relevant Results             Assessment/Plan   Assessment & Plan  Frequent falls  Suspect mechanical due to chronic pain and debility  However, given reports of intermittent dizziness will also check orthostats  PT/OT  TCC/SWK consult for placement   Fall precautions  Agreeable to SNF placement   Hypokalemia  Replace PRN  Primary hypertension  Continue home amlodipine and lisinopril with hold parameters  Other chronic pain  Continue home gabapentin and percocet PRN  BPH (benign prostatic hyperplasia)  Continue home finasteride  Cognitive impairment  With depression  Currently A&Ox4  Continue home zoloft     Plan:  Admit to RNF  Check orthostats   PT/OT  TCC/SWK consult for placement        Brooke Dietz MD

## 2025-01-02 NOTE — ASSESSMENT & PLAN NOTE
Suspect mechanical due to chronic pain and debility  However, given reports of intermittent dizziness will also check orthostats  PT/OT  TCC/SWK consult for placement   Fall precautions  Agreeable to SNF placement

## 2025-01-02 NOTE — PROGRESS NOTES
Pharmacy Medication History Review    Karl Lopez is a 69 y.o. male. Pharmacy reviewed the patient's [id number] medications and allergies for accuracy.    Medications ADDED:  None   Medications CHANGED:  Omeprazole OTC 20mg - not taking   Medications REMOVED:   None      The list below reflects the updated PTA list. Comments regarding how patient may be taking medications differently can be found in the Admit Orders Activity  Prior to Admission Medications   Prescriptions Last Dose Informant   amLODIPine (Norvasc) 10 mg tablet 1/1/2025 Spouse/Significant Other   Sig: Take 1 tablet (10 mg) by mouth once daily.   ascorbic acid (Vitamin C) 500 mg tablet 1/1/2025 Spouse/Significant Other   Sig: Take 6 tablets (3,000 mg) by mouth once daily.   cholecalciferol (Vitamin D-3) 25 MCG (1000 UT) tablet 1/1/2025 Spouse/Significant Other   Sig: Take 1 tablet (25 mcg) by mouth once daily.   finasteride (Proscar) 5 mg tablet 1/1/2025 Spouse/Significant Other   Sig: Take 1 tablet (5 mg) by mouth once daily. Do not crush, chew, or split.   gabapentin (Neurontin) 100 mg capsule 1/1/2025 Spouse/Significant Other   Sig: Take 1 capsule (100 mg) by mouth 4 times a day as needed.   metoprolol succinate XL (Toprol-XL) 100 mg 24 hr tablet 1/1/2025 Spouse/Significant Other   Sig: Take 1 tablet (100 mg) by mouth 2 times a day.   omeprazole OTC (PriLOSEC OTC) 20 mg EC tablet Not Taking Spouse/Significant Other   Sig: Take 1 tablet (20 mg) by mouth once daily. Do not crush, chew, or split.   Patient not taking: Reported on 1/2/2025   oxyCODONE-acetaminophen (Percocet)  mg tablet 1/2/2025 Spouse/Significant Other   Sig: TAKE 1 TABLET BY MOUTH 5 TIMES A DAY AS NEEDED   ramipril (Altace) 10 mg capsule 1/1/2025 Spouse/Significant Other   Sig: Take 2 capsules (20 mg) by mouth once daily.   sertraline (Zoloft) 50 mg tablet 1/1/2025 Spouse/Significant Other   Sig: Take 2 tablets (100 mg) by mouth once daily.   sildenafil (Viagra)  100 mg tablet Unknown Spouse/Significant Other   Sig: Take 1 tablet (100 mg) by mouth if needed for erectile dysfunction.      Facility-Administered Medications: None        The list below reflects the updated allergy list. Please review each documented allergy for additional clarification and justification.  Allergies  Reviewed by Silvana Roman CPhT on 1/2/2025        Severity Reactions Comments    Latex High Hives, Itching     Talwin Compound High Hives     Nsaids (non-steroidal Anti-inflammatory Drug) Medium GI Upset, Nausea/vomiting     Pentazocine Medium GI Upset, Nausea/vomiting             Pharmacy has been updated to [facility].    Sources used to complete the med history include dispense history, OARRS, PTA medication list, patient/family interview. Family is a fair historian.    Below are additional concerns with the patient's PTA list.  Family reports patient taking Coricidin but cannot confirm which one. They report patient taking Trazodone last night to sleep but I cannot verify which strength. I do not see a recent prescription    Silvana Roman CPhT-Adv  Please reach out via [email or web address] Secure Chat for questions

## 2025-01-02 NOTE — ED PROVIDER NOTES
HPI   Chief Complaint   Patient presents with    Fall       HPI    Patient is a 69-year-old male coming from home for evaluation after a fall.  According to EMS, patient fell out of bed approximately 4 hours ago and was still on the floor.  Patient states he has fallen 4 times within the past 2 days.  He states that he is unsteady on his feet normally and does use a walker to assist him with ambulation.  He is unsure if he hit his head on his fall but does not believe that he did.  He is complaining predominantly of an aching low back pain.  No numbness or tingling of his extremities.  No bowel or bladder incontinence.  No urinary retention.  No chest pain or shortness of breath.  No recent illness.  No fevers or chills.  Patient states that he lives at home with his wife and feels as if he is properly cared for.  He is not interested in attending rehab at this time.    Patient History   No past medical history on file.  No past surgical history on file.  No family history on file.  Social History     Tobacco Use    Smoking status: Former     Types: Cigarettes    Smokeless tobacco: Never   Vaping Use    Vaping status: Never Used   Substance Use Topics    Alcohol use: Not Currently     Comment: 1 beer a day    Drug use: Never       Physical Exam   ED Triage Vitals [01/02/25 1053]   Temperature Heart Rate Respirations BP   36.3 °C (97.4 °F) 71 18 132/78      Pulse Ox Temp Source Heart Rate Source Patient Position   97 % Oral Monitor Lying      BP Location FiO2 (%)     Left arm --       Physical Exam  Vitals and nursing note reviewed.   Constitutional:       Appearance: Normal appearance.   HENT:      Head: Normocephalic and atraumatic.   Eyes:      Extraocular Movements: Extraocular movements intact.      Pupils: Pupils are equal, round, and reactive to light.   Neck:      Comments: No C-spine tenderness, palpable deformities or step-offs.  Cardiovascular:      Rate and Rhythm: Normal rate and regular rhythm.    Pulmonary:      Effort: Pulmonary effort is normal.      Breath sounds: Normal breath sounds.   Abdominal:      General: Abdomen is flat. Bowel sounds are normal.      Palpations: Abdomen is soft.   Musculoskeletal:      Cervical back: Normal range of motion and neck supple.      Comments: Diffuse tenderness of the lumbar spine without specific L-spine tenderness, palpable deformities or step-offs.   Skin:     General: Skin is warm and dry.   Neurological:      Mental Status: He is alert.           ED Course & MDM   ED Course as of 01/02/25 1448   Thu Jan 02, 2025   1114 EKG interpreted by me: Normal sinus rhythm, rate 64.  Normal axis.  No significant ST or T wave abnormalities. [ML]      ED Course User Index  [ML] Ernesto Forde MD         Diagnoses as of 01/02/25 1448   Hypokalemia   Recurrent falls   Weakness                 No data recorded     Graham Coma Scale Score: 15 (01/02/25 1051 : Citlalli Ritchie RN)                           Medical Decision Making  Parts of this chart have been completed using voice recognition software. Please excuse any errors of transcription. Despite the medical decision making time stamp above-my medical decision making has taken place during the patient's entire visit. My thought process and reason for plan has been formulated from the time that I saw the patient until the time of disposition and is not specific to one specific moment during their visit and furthermore my MDM encompasses this entire chart and not only this text box.      HPI: Detailed above.    Exam: A medically appropriate exam performed, outlined above, given the known history and presentation.    History obtained from: Patient    Social Determinants of Health considered during this visit: From home    EKG interpreted by my attending physician, reviewed by myself.    Labs Reviewed   COMPREHENSIVE METABOLIC PANEL - Abnormal       Result Value    Glucose 94      Sodium 142      Potassium 3.2 (*)     Chloride  107      Bicarbonate 29      Anion Gap 9 (*)     Urea Nitrogen 11      Creatinine 0.69      eGFR >90      Calcium 8.5 (*)     Albumin 3.7      Alkaline Phosphatase 72      Total Protein 6.1 (*)     AST 43 (*)     Bilirubin, Total 0.8      ALT 20     URINALYSIS WITH REFLEX CULTURE AND MICROSCOPIC - Abnormal    Color, Urine Yellow      Appearance, Urine Clear      Specific Gravity, Urine 1.022      pH, Urine 6.0      Protein, Urine NEGATIVE      Glucose, Urine Normal      Blood, Urine NEGATIVE      Ketones, Urine TRACE (*)     Bilirubin, Urine NEGATIVE      Urobilinogen, Urine 6 (2+) (*)     Nitrite, Urine NEGATIVE      Leukocyte Esterase, Urine NEGATIVE     MAGNESIUM - Normal    Magnesium 2.13     SERIAL TROPONIN-INITIAL - Normal    Troponin I, High Sensitivity 9      Narrative:     Less than 99th percentile of normal range cutoff-  Female and children under 18 years old <14 ng/L; Male <21 ng/L: Negative  Repeat testing should be performed if clinically indicated.     Female and children under 18 years old 14-50 ng/L; Male 21-50 ng/L:  Consistent with possible cardiac damage and possible increased clinical   risk. Serial measurements may help to assess extent of myocardial damage.     >50 ng/L: Consistent with cardiac damage, increased clinical risk and  myocardial infarction. Serial measurements may help assess extent of   myocardial damage.      NOTE: Children less than 1 year old may have higher baseline troponin   levels and results should be interpreted in conjunction with the overall   clinical context.     NOTE: Troponin I testing is performed using a different   testing methodology at [facility] than at other   [facility] hospitals. Direct result comparisons should only   be made within the same method.   CBC WITH AUTO DIFFERENTIAL    WBC 6.7      nRBC 0.0      RBC 5.21      Hemoglobin 15.2      Hematocrit 45.1      MCV 87      MCH 29.2      MCHC 33.7      RDW 13.1      Platelets 182       Neutrophils % 64.8      Immature Granulocytes %, Automated 0.6      Lymphocytes % 21.5      Monocytes % 9.7      Eosinophils % 2.8      Basophils % 0.6      Neutrophils Absolute 4.34      Immature Granulocytes Absolute, Automated 0.04      Lymphocytes Absolute 1.44      Monocytes Absolute 0.65      Eosinophils Absolute 0.19      Basophils Absolute 0.04     URINALYSIS WITH REFLEX CULTURE AND MICROSCOPIC    Narrative:     The following orders were created for panel order Urinalysis with Reflex Culture and Microscopic.  Procedure                               Abnormality         Status                     ---------                               -----------         ------                     Urinalysis with Reflex C...[[id number]]  Abnormal            Final result               Extra Urine Gray Tube[[id number]]                            In process                   Please view results for these tests on the individual orders.   TROPONIN SERIES- (INITIAL, 1 HR)    Narrative:     The following orders were created for panel order Troponin Series, (0, 1 HR).  Procedure                               Abnormality         Status                     ---------                               -----------         ------                     Troponin I, High Sensiti...[[id number]]  Normal              Final result               Troponin, High Sensitivi...[[id number]]                      In process                   Please view results for these tests on the individual orders.   EXTRA URINE GRAY TUBE   SERIAL TROPONIN, 1 HOUR     CT lumbar spine wo IV contrast   Final Result   Surgical and arthritic changes in the lumbosacral spine as described.   Please see above for details.        MACRO:   None        Signed by: Thomas Talavera 1/2/2025 1:32 PM   Dictation workstation:   [id number]      CT head wo IV contrast   Final Result   No depressed skull fracture. No acute intracranial bleed or focal   mass effect.        Mild volume loss.         Mild chronic white matter ischemic disease in the deep   periventricular regions.        Stable chronic left maxillary sinusitis.        MACRO:   None        Signed by: Thomas Talavera 1/2/2025 1:35 PM   Dictation workstation:   [id number]      CT cervical spine wo IV contrast   Final Result   Cervical spine DJD throughout as described. No CT evidence of   cervical spine fracture in this exam.        MACRO:   None        Signed by: Thomas Talavera 1/2/2025 1:24 PM   Dictation workstation:   [id number]        Medications   potassium chloride CR (Klor-Con M20) ER tablet 40 mEq (40 mEq oral Given 1/2/25 1345)       Differential diagnoses considered for this visit include: Acute intracranial processes versus cervical spine instability versus compression fracture versus electrolyte imbalance versus metabolic disturbance versus infection    Considerations/further MDM:    Patient is a 69-year-old male who lives at home presenting for evaluation after frequent falls.  Vital signs are hemodynamically stable.  Physical exam did demonstrate diffuse tenderness of the lumbar spine without specificity.  There is no palpable deformities.  Patient appeared to be neurologically intact.  Workup was pursued given patient's persistent falls and feelings of unsteadiness.  He states that he was not interested in rehab placement at this time.    I obtained further history from patient's wife when she arrived.  She states that the patient had rolled out of bed during sleep and she did not hear him.  She was unable to get him up off of the floor.  He was unable to crawl.  She states that she does have home health come to the house but she does not feel comfortable being able to take care of him.  She states that they are working with her insurance company to have him placed for rehab.  On further discussion, the patient states that he is willing to do what ever his wife sees fit.  His wife is requesting that the patient be placed in rehab.   Patient is now willing to be placed.    CBC was negative for leukocytosis or anemia.  CMP showed mild hypokalemia with a potassium of 3.2, supplemented with 40 mEq oral potassium.  Initial troponin of 9.  Magnesium of 2.13.  CT head without contrast showed no depressed skull fracture, no acute intracranial bleed or focal mass effect.  CT lumbar spine shows surgical and arthritic changes in the lumbosacral spine.  CT cervical spine showed no fracture.  Urinalysis was negative for infection.  I discussed patient's results with him and the wife at the bedside.  Given that the wife felt she could no longer care for the patient at home with her current attempts to place patient in rehab, I did recommend admission today so that the patient could be placed more easily in a facility.  Wife and patient were agreeable to this.  I consulted with the on-call hospitalist, and the patient was admitted to the hospital for placement in rehab facility.    The patient/family was counseled on clinical impression, expectations, and plan along with recommendations to admission. All questions were answered and involved parties were understanding and agreeable to course of treatment. Case was discussed with admitting physician and any consultants. Bed type, ED treatment and further ED workup decided by joint decision making with admitting team and any consultants. Patient stable for admission per my assessment and further management of patient will be deferred to the inpatient setting.    Attending physician Dr. Ernesto Forde was available for consultation.  Patient's history, physical exam, diagnostic studies, and treatment plan were discussed thoroughly.    Procedure  Procedures     Tarah Bangura PA-C  01/02/25 1703

## 2025-01-03 VITALS
HEART RATE: 79 BPM | OXYGEN SATURATION: 97 % | RESPIRATION RATE: 18 BRPM | HEIGHT: 70 IN | DIASTOLIC BLOOD PRESSURE: 79 MMHG | SYSTOLIC BLOOD PRESSURE: 141 MMHG | TEMPERATURE: 97.5 F | BODY MASS INDEX: 25.82 KG/M2 | WEIGHT: 180.34 LBS

## 2025-01-03 LAB
ANION GAP SERPL CALCULATED.3IONS-SCNC: 10 MMOL/L (ref 10–20)
BUN SERPL-MCNC: 8 MG/DL (ref 6–23)
CALCIUM SERPL-MCNC: 8.7 MG/DL (ref 8.6–10.3)
CHLORIDE SERPL-SCNC: 107 MMOL/L (ref 98–107)
CO2 SERPL-SCNC: 28 MMOL/L (ref 21–32)
CREAT SERPL-MCNC: 0.56 MG/DL (ref 0.5–1.3)
EGFRCR SERPLBLD CKD-EPI 2021: >90 ML/MIN/1.73M*2
ERYTHROCYTE [DISTWIDTH] IN BLOOD BY AUTOMATED COUNT: 13.1 % (ref 11.5–14.5)
GLUCOSE SERPL-MCNC: 88 MG/DL (ref 74–99)
HCT VFR BLD AUTO: 43.9 % (ref 41–52)
HGB BLD-MCNC: 15.4 G/DL (ref 13.5–17.5)
HOLD SPECIMEN: NORMAL
MCH RBC QN AUTO: 29.8 PG (ref 26–34)
MCHC RBC AUTO-ENTMCNC: 35.1 G/DL (ref 32–36)
MCV RBC AUTO: 85 FL (ref 80–100)
NRBC BLD-RTO: 0 /100 WBCS (ref 0–0)
PLATELET # BLD AUTO: 187 X10*3/UL (ref 150–450)
POTASSIUM SERPL-SCNC: 4.2 MMOL/L (ref 3.5–5.3)
RBC # BLD AUTO: 5.16 X10*6/UL (ref 4.5–5.9)
SODIUM SERPL-SCNC: 141 MMOL/L (ref 136–145)
WBC # BLD AUTO: 8.8 X10*3/UL (ref 4.4–11.3)

## 2025-01-03 PROCEDURE — 80048 BASIC METABOLIC PNL TOTAL CA: CPT | Performed by: STUDENT IN AN ORGANIZED HEALTH CARE EDUCATION/TRAINING PROGRAM

## 2025-01-03 PROCEDURE — 2500000004 HC RX 250 GENERAL PHARMACY W/ HCPCS (ALT 636 FOR OP/ED): Performed by: STUDENT IN AN ORGANIZED HEALTH CARE EDUCATION/TRAINING PROGRAM

## 2025-01-03 PROCEDURE — 2500000001 HC RX 250 WO HCPCS SELF ADMINISTERED DRUGS (ALT 637 FOR MEDICARE OP): Performed by: STUDENT IN AN ORGANIZED HEALTH CARE EDUCATION/TRAINING PROGRAM

## 2025-01-03 PROCEDURE — 97166 OT EVAL MOD COMPLEX 45 MIN: CPT | Mod: GO

## 2025-01-03 PROCEDURE — 99239 HOSP IP/OBS DSCHRG MGMT >30: CPT | Performed by: STUDENT IN AN ORGANIZED HEALTH CARE EDUCATION/TRAINING PROGRAM

## 2025-01-03 PROCEDURE — G0378 HOSPITAL OBSERVATION PER HR: HCPCS

## 2025-01-03 PROCEDURE — 85027 COMPLETE CBC AUTOMATED: CPT | Performed by: STUDENT IN AN ORGANIZED HEALTH CARE EDUCATION/TRAINING PROGRAM

## 2025-01-03 PROCEDURE — 2500000002 HC RX 250 W HCPCS SELF ADMINISTERED DRUGS (ALT 637 FOR MEDICARE OP, ALT 636 FOR OP/ED): Performed by: STUDENT IN AN ORGANIZED HEALTH CARE EDUCATION/TRAINING PROGRAM

## 2025-01-03 PROCEDURE — 97162 PT EVAL MOD COMPLEX 30 MIN: CPT | Mod: GP

## 2025-01-03 PROCEDURE — 97530 THERAPEUTIC ACTIVITIES: CPT | Mod: GP

## 2025-01-03 PROCEDURE — 97530 THERAPEUTIC ACTIVITIES: CPT | Mod: GO

## 2025-01-03 PROCEDURE — 36415 COLL VENOUS BLD VENIPUNCTURE: CPT | Performed by: STUDENT IN AN ORGANIZED HEALTH CARE EDUCATION/TRAINING PROGRAM

## 2025-01-03 RX ORDER — QUETIAPINE FUMARATE 25 MG/1
12.5 TABLET, FILM COATED ORAL NIGHTLY
Status: DISCONTINUED | OUTPATIENT
Start: 2025-01-03 | End: 2025-01-03 | Stop reason: HOSPADM

## 2025-01-03 RX ORDER — HYDROXYZINE HYDROCHLORIDE 10 MG/1
10 TABLET, FILM COATED ORAL EVERY 8 HOURS PRN
Start: 2025-01-03

## 2025-01-03 RX ORDER — HYDROXYZINE HYDROCHLORIDE 10 MG/1
10 TABLET, FILM COATED ORAL EVERY 8 HOURS PRN
Status: DISCONTINUED | OUTPATIENT
Start: 2025-01-03 | End: 2025-01-03 | Stop reason: HOSPADM

## 2025-01-03 RX ADMIN — AMLODIPINE BESYLATE 10 MG: 10 TABLET ORAL at 08:58

## 2025-01-03 RX ADMIN — OXYCODONE AND ACETAMINOPHEN 1 TABLET: 10; 325 TABLET ORAL at 06:19

## 2025-01-03 RX ADMIN — LISINOPRIL 40 MG: 40 TABLET ORAL at 08:58

## 2025-01-03 RX ADMIN — GABAPENTIN 100 MG: 100 CAPSULE ORAL at 14:50

## 2025-01-03 RX ADMIN — SERTRALINE 100 MG: 100 TABLET, FILM COATED ORAL at 08:58

## 2025-01-03 RX ADMIN — FINASTERIDE 5 MG: 5 TABLET, FILM COATED ORAL at 08:58

## 2025-01-03 RX ADMIN — OXYCODONE AND ACETAMINOPHEN 1 TABLET: 10; 325 TABLET ORAL at 11:32

## 2025-01-03 RX ADMIN — HYDROXYZINE HYDROCHLORIDE 10 MG: 10 TABLET ORAL at 16:45

## 2025-01-03 RX ADMIN — ACETAMINOPHEN 650 MG: 325 TABLET ORAL at 14:50

## 2025-01-03 RX ADMIN — ENOXAPARIN SODIUM 40 MG: 40 INJECTION SUBCUTANEOUS at 08:56

## 2025-01-03 RX ADMIN — METOPROLOL SUCCINATE 100 MG: 100 TABLET, EXTENDED RELEASE ORAL at 09:00

## 2025-01-03 RX ADMIN — OXYCODONE AND ACETAMINOPHEN 1 TABLET: 10; 325 TABLET ORAL at 18:13

## 2025-01-03 SDOH — HEALTH STABILITY: PHYSICAL HEALTH: ON AVERAGE, HOW MANY MINUTES DO YOU ENGAGE IN EXERCISE AT THIS LEVEL?: 0 MIN

## 2025-01-03 SDOH — SOCIAL STABILITY: SOCIAL INSECURITY: WITHIN THE LAST YEAR, HAVE YOU BEEN HUMILIATED OR EMOTIONALLY ABUSED IN OTHER WAYS BY YOUR PARTNER OR EX-PARTNER?: NO

## 2025-01-03 SDOH — HEALTH STABILITY: MENTAL HEALTH: HOW OFTEN DO YOU HAVE A DRINK CONTAINING ALCOHOL?: NEVER

## 2025-01-03 SDOH — ECONOMIC STABILITY: FOOD INSECURITY: WITHIN THE PAST 12 MONTHS, YOU WORRIED THAT YOUR FOOD WOULD RUN OUT BEFORE YOU GOT THE MONEY TO BUY MORE.: NEVER TRUE

## 2025-01-03 SDOH — SOCIAL STABILITY: SOCIAL INSECURITY: ARE YOU MARRIED, WIDOWED, DIVORCED, SEPARATED, NEVER MARRIED, OR LIVING WITH A PARTNER?: MARRIED

## 2025-01-03 SDOH — HEALTH STABILITY: MENTAL HEALTH: HOW MANY DRINKS CONTAINING ALCOHOL DO YOU HAVE ON A TYPICAL DAY WHEN YOU ARE DRINKING?: PATIENT DOES NOT DRINK

## 2025-01-03 SDOH — SOCIAL STABILITY: SOCIAL NETWORK: IN A TYPICAL WEEK, HOW MANY TIMES DO YOU TALK ON THE PHONE WITH FAMILY, FRIENDS, OR NEIGHBORS?: NEVER

## 2025-01-03 SDOH — SOCIAL STABILITY: SOCIAL INSECURITY
WITHIN THE LAST YEAR, HAVE YOU BEEN KICKED, HIT, SLAPPED, OR OTHERWISE PHYSICALLY HURT BY YOUR PARTNER OR EX-PARTNER?: NO

## 2025-01-03 SDOH — ECONOMIC STABILITY: TRANSPORTATION INSECURITY: IN THE PAST 12 MONTHS, HAS LACK OF TRANSPORTATION KEPT YOU FROM MEDICAL APPOINTMENTS OR FROM GETTING MEDICATIONS?: NO

## 2025-01-03 SDOH — SOCIAL STABILITY: SOCIAL NETWORK: HOW OFTEN DO YOU ATTEND CHURCH OR RELIGIOUS SERVICES?: MORE THAN 4 TIMES PER YEAR

## 2025-01-03 SDOH — ECONOMIC STABILITY: HOUSING INSECURITY: AT ANY TIME IN THE PAST 12 MONTHS, WERE YOU HOMELESS OR LIVING IN A SHELTER (INCLUDING NOW)?: NO

## 2025-01-03 SDOH — HEALTH STABILITY: MENTAL HEALTH
DO YOU FEEL STRESS - TENSE, RESTLESS, NERVOUS, OR ANXIOUS, OR UNABLE TO SLEEP AT NIGHT BECAUSE YOUR MIND IS TROUBLED ALL THE TIME - THESE DAYS?: NOT AT ALL

## 2025-01-03 SDOH — SOCIAL STABILITY: SOCIAL NETWORK
DO YOU BELONG TO ANY CLUBS OR ORGANIZATIONS SUCH AS CHURCH GROUPS, UNIONS, FRATERNAL OR ATHLETIC GROUPS, OR SCHOOL GROUPS?: NO

## 2025-01-03 SDOH — SOCIAL STABILITY: SOCIAL INSECURITY
WITHIN THE LAST YEAR, HAVE YOU BEEN RAPED OR FORCED TO HAVE ANY KIND OF SEXUAL ACTIVITY BY YOUR PARTNER OR EX-PARTNER?: NO

## 2025-01-03 SDOH — ECONOMIC STABILITY: HOUSING INSECURITY: IN THE PAST 12 MONTHS, HOW MANY TIMES HAVE YOU MOVED WHERE YOU WERE LIVING?: 0

## 2025-01-03 SDOH — ECONOMIC STABILITY: HOUSING INSECURITY: IN THE LAST 12 MONTHS, WAS THERE A TIME WHEN YOU WERE NOT ABLE TO PAY THE MORTGAGE OR RENT ON TIME?: NO

## 2025-01-03 SDOH — ECONOMIC STABILITY: INCOME INSECURITY: IN THE PAST 12 MONTHS HAS THE ELECTRIC, GAS, OIL, OR WATER COMPANY THREATENED TO SHUT OFF SERVICES IN YOUR HOME?: NO

## 2025-01-03 SDOH — HEALTH STABILITY: PHYSICAL HEALTH
HOW OFTEN DO YOU NEED TO HAVE SOMEONE HELP YOU WHEN YOU READ INSTRUCTIONS, PAMPHLETS, OR OTHER WRITTEN MATERIAL FROM YOUR DOCTOR OR PHARMACY?: NEVER

## 2025-01-03 SDOH — HEALTH STABILITY: PHYSICAL HEALTH: ON AVERAGE, HOW MANY DAYS PER WEEK DO YOU ENGAGE IN MODERATE TO STRENUOUS EXERCISE (LIKE A BRISK WALK)?: 0 DAYS

## 2025-01-03 SDOH — HEALTH STABILITY: MENTAL HEALTH: HOW OFTEN DO YOU HAVE SIX OR MORE DRINKS ON ONE OCCASION?: NEVER

## 2025-01-03 SDOH — SOCIAL STABILITY: SOCIAL INSECURITY: WITHIN THE LAST YEAR, HAVE YOU BEEN AFRAID OF YOUR PARTNER OR EX-PARTNER?: NO

## 2025-01-03 SDOH — SOCIAL STABILITY: SOCIAL NETWORK: HOW OFTEN DO YOU GET TOGETHER WITH FRIENDS OR RELATIVES?: ONCE A WEEK

## 2025-01-03 SDOH — ECONOMIC STABILITY: FOOD INSECURITY: WITHIN THE PAST 12 MONTHS, THE FOOD YOU BOUGHT JUST DIDN'T LAST AND YOU DIDN'T HAVE MONEY TO GET MORE.: NEVER TRUE

## 2025-01-03 SDOH — ECONOMIC STABILITY: FOOD INSECURITY: HOW HARD IS IT FOR YOU TO PAY FOR THE VERY BASICS LIKE FOOD, HOUSING, MEDICAL CARE, AND HEATING?: NOT VERY HARD

## 2025-01-03 SDOH — SOCIAL STABILITY: SOCIAL NETWORK: HOW OFTEN DO YOU GET TOGETHER WITH FRIENDS OR RELATIVES?: NEVER

## 2025-01-03 SDOH — ECONOMIC STABILITY: FOOD INSECURITY: HOW HARD IS IT FOR YOU TO PAY FOR THE VERY BASICS LIKE FOOD, HOUSING, MEDICAL CARE, AND HEATING?: NOT HARD AT ALL

## 2025-01-03 SDOH — SOCIAL STABILITY: SOCIAL NETWORK: IN A TYPICAL WEEK, HOW MANY TIMES DO YOU TALK ON THE PHONE WITH FAMILY, FRIENDS, OR NEIGHBORS?: TWICE A WEEK

## 2025-01-03 SDOH — SOCIAL STABILITY: SOCIAL NETWORK: HOW OFTEN DO YOU ATTEND MEETINGS OF THE CLUBS OR ORGANIZATIONS YOU BELONG TO?: NEVER

## 2025-01-03 ASSESSMENT — COGNITIVE AND FUNCTIONAL STATUS - GENERAL
MOVING TO AND FROM BED TO CHAIR: TOTAL
WALKING IN HOSPITAL ROOM: A LOT
EATING MEALS: A LITTLE
DRESSING REGULAR LOWER BODY CLOTHING: A LOT
MOVING FROM LYING ON BACK TO SITTING ON SIDE OF FLAT BED WITH BEDRAILS: A LOT
DRESSING REGULAR UPPER BODY CLOTHING: A LITTLE
MOVING FROM LYING ON BACK TO SITTING ON SIDE OF FLAT BED WITH BEDRAILS: A LOT
PERSONAL GROOMING: A LOT
STANDING UP FROM CHAIR USING ARMS: TOTAL
DRESSING REGULAR LOWER BODY CLOTHING: TOTAL
WALKING IN HOSPITAL ROOM: TOTAL
TURNING FROM BACK TO SIDE WHILE IN FLAT BAD: A LOT
CLIMB 3 TO 5 STEPS WITH RAILING: A LOT
MOVING TO AND FROM BED TO CHAIR: A LOT
DAILY ACTIVITIY SCORE: 11
STANDING UP FROM CHAIR USING ARMS: A LITTLE
DRESSING REGULAR UPPER BODY CLOTHING: A LOT
TOILETING: TOTAL
TURNING FROM BACK TO SIDE WHILE IN FLAT BAD: A LOT
MOBILITY SCORE: 13
HELP NEEDED FOR BATHING: A LITTLE
MOBILITY SCORE: 8
CLIMB 3 TO 5 STEPS WITH RAILING: TOTAL
HELP NEEDED FOR BATHING: A LOT

## 2025-01-03 ASSESSMENT — ACTIVITIES OF DAILY LIVING (ADL)
LACK_OF_TRANSPORTATION: NO
BATHING_ASSISTANCE: MAXIMAL
LACK_OF_TRANSPORTATION: NO
ADL_ASSISTANCE: NEEDS ASSISTANCE
ADLS_ADDRESSED: NO
LACK_OF_TRANSPORTATION: NO
HOME_MANAGEMENT_TIME_ENTRY: 4

## 2025-01-03 ASSESSMENT — PAIN SCALES - GENERAL
PAINLEVEL_OUTOF10: 9
PAINLEVEL_OUTOF10: 10 - WORST POSSIBLE PAIN
PAINLEVEL_OUTOF10: 8
PAINLEVEL_OUTOF10: 0 - NO PAIN
PAINLEVEL_OUTOF10: 0 - NO PAIN
PAINLEVEL_OUTOF10: 10 - WORST POSSIBLE PAIN
PAINLEVEL_OUTOF10: 1
PAINLEVEL_OUTOF10: 3

## 2025-01-03 ASSESSMENT — PAIN - FUNCTIONAL ASSESSMENT
PAIN_FUNCTIONAL_ASSESSMENT: 0-10
PAIN_FUNCTIONAL_ASSESSMENT: FLACC (FACE, LEGS, ACTIVITY, CRY, CONSOLABILITY)
PAIN_FUNCTIONAL_ASSESSMENT: FLACC (FACE, LEGS, ACTIVITY, CRY, CONSOLABILITY)
PAIN_FUNCTIONAL_ASSESSMENT: 0-10

## 2025-01-03 ASSESSMENT — LIFESTYLE VARIABLES
AUDIT-C TOTAL SCORE: 0
SKIP TO QUESTIONS 9-10: 1

## 2025-01-03 ASSESSMENT — PAIN DESCRIPTION - ORIENTATION
ORIENTATION: LOWER
ORIENTATION: LOWER
ORIENTATION: LOWER;MID
ORIENTATION: LOWER

## 2025-01-03 ASSESSMENT — PAIN DESCRIPTION - LOCATION
LOCATION: BACK

## 2025-01-03 NOTE — PROGRESS NOTES
01/03/25 1050   Discharge Planning   Living Arrangements Spouse/significant other   Support Systems Spouse/significant other   Type of Residence Private residence   Number of Stairs to Enter Residence 2   Number of Stairs Within Residence 0   Do you have animals or pets at home? No   Who is requesting discharge planning? Provider   Home or Post Acute Services Post acute facilities (Rehab/SNF/etc)   Type of Post Acute Facility Services Skilled nursing   Expected Discharge Disposition SNF   Does the patient need discharge transport arranged? Yes   RoundTrip coordination needed? Yes   Has discharge transport been arranged? No   Financial Resource Strain   How hard is it for you to pay for the very basics like food, housing, medical care, and heating? Not hard   Housing Stability   In the last 12 months, was there a time when you were not able to pay the mortgage or rent on time? N   In the past 12 months, how many times have you moved where you were living? 0   At any time in the past 12 months, were you homeless or living in a shelter (including now)? N   Transportation Needs   In the past 12 months, has lack of transportation kept you from medical appointments or from getting medications? no   In the past 12 months, has lack of transportation kept you from meetings, work, or from getting things needed for daily living? No   Patient Choice   Provider Choice list and CMS website (https://medicare.gov/care-compare#search) for post-acute Quality and Resource Measure Data were provided and reviewed with: Family   Patient / Family choosing to utilize agency / facility established prior to hospitalization Yes   Stroke Family Assessment   Stroke Family Assessment Needed No

## 2025-01-03 NOTE — NURSING NOTE
Nurse to nurse report given to WCP and patient is being transported by [facility]. IV out and belonging sent with patient his money was sent in white envelope and sealed with tape about $15.

## 2025-01-03 NOTE — CARE PLAN
The patient's goals for the shift include no pain    The clinical goals for the shift include Patient remain safe without falls throughout shift    Problem: Skin  Goal: Decreased wound size/increased tissue granulation at next dressing change  1/3/2025 0245 by Flakita Fernandez RN  Outcome: Progressing  Flowsheets (Taken 1/3/2025 0245)  Decreased wound size/increased tissue granulation at next dressing change: Protective dressings over bony prominences  1/3/2025 0244 by Flakita Fernandez RN  Outcome: Progressing  Goal: Participates in plan/prevention/treatment measures  1/3/2025 0245 by Flakita Fernandez RN  Outcome: Progressing  Flowsheets (Taken 1/3/2025 0245)  Participates in plan/prevention/treatment measures:   Elevate heels   Discuss with provider PT/OT consult   Increase activity/out of bed for meals  1/3/2025 0244 by Flakita Fernandez RN  Outcome: Progressing  Goal: Prevent/manage excess moisture  1/3/2025 0245 by Flakita Fernandez RN  Outcome: Progressing  Flowsheets (Taken 1/3/2025 0245)  Prevent/manage excess moisture:   Cleanse incontinence/protect with barrier cream   Moisturize dry skin   Monitor for/manage infection if present   Use wicking fabric (obtain order)  1/3/2025 0244 by Flakita Fernandez RN  Outcome: Progressing  Goal: Prevent/minimize sheer/friction injuries  1/3/2025 0245 by Flakita Fernandez RN  Outcome: Progressing  Flowsheets (Taken 1/3/2025 0245)  Prevent/minimize sheer/friction injuries:   Turn/reposition every 2 hours/use positioning/transfer devices   Increase activity/out of bed for meals   Use pull sheet   HOB 30 degrees or less  1/3/2025 0244 by Flakita Fernandez RN  Outcome: Progressing  Goal: Promote/optimize nutrition  1/3/2025 0245 by Flakita Fernandez RN  Outcome: Progressing  Flowsheets (Taken 1/3/2025 0245)  Promote/optimize nutrition:   Consume > 50% meals/supplements   Monitor/record intake including meals   Offer water/supplements/favorite foods  1/3/2025 0244  by Flakita Fernandez RN  Outcome: Progressing  Goal: Promote skin healing  1/3/2025 0245 by Flakita Fernandez RN  Outcome: Progressing  Flowsheets (Taken 1/3/2025 0245)  Promote skin healing:   Assess skin/pad under line(s)/device(s)   Turn/reposition every 2 hours/use positioning/transfer devices   Rotate device position/do not position patient on device  1/3/2025 0244 by Flakita Fernandez RN  Outcome: Progressing     Problem: Pain - Adult  Goal: Verbalizes/displays adequate comfort level or baseline comfort level  1/3/2025 0245 by Flakita Fernandez RN  Outcome: Progressing  1/3/2025 0244 by Flakita Fernandez RN  Outcome: Progressing

## 2025-01-03 NOTE — PROGRESS NOTES
Occupational Therapy    Evaluation/Treatment    Patient Name: Karl Lopez  MRN: [id number]  Department: [address]  Room: [facility]42-  Today's Date: 01/03/25  Time Calculation  Start Time: 0814  Stop Time: 0840  Time Calculation (min): 26 min       Assessment:  OT Assessment: Patient is a 69 year old male admitted with hypokalemia, primary HTN, frequent falls, other chronic pain, cognitive imapirment, BPH. Patient is presenting below baseline level with a decline in strength, balance, activity tolerance, and function, resulting in an increased need for assist with ADL tasks. Patient is requiring assist of 2 for safe transfers and unable to complete mobility tasks. Patient would benefit from skilled OT to maximize his safety and independence with daily tasks.  Prognosis: Fair  Barriers to Discharge Home: Caregiver assistance, Physical needs, Cognition needs  Caregiver Assistance: Caregiver assistance needed per identified barriers - however, level of patient's required assistance exceeds assistance available at home  Cognition Needs: 24hr supervision for safety awareness needed, Insight of patient limited regarding functional ability/needs, Medication and/or medical management daily assist needed, Cognition-related high falls risk  Physical Needs: 24hr mobility assistance needed, 24hr ADL assistance needed, High falls risk due to function or environment  Evaluation/Treatment Tolerance: Patient tolerated treatment well  End of Session Communication: Bedside nurse  End of Session Patient Position: Up in chair, Alarm on (spouse at bedside)  OT Assessment Results: Decreased ADL status, Decreased upper extremity strength, Decreased safe judgment during ADL, Decreased cognition, Decreased endurance, Decreased sensation, Visual deficit, Decreased fine motor control, Decreased functional mobility, Decreased gross motor control, Decreased IADLs, Decreased trunk control for functional activities  Prognosis: Fair  Barriers to  Discharge: Other (Comment) (assist for txfers and ADLs, high fall risk, decreased cognition)  Evaluation/Treatment Tolerance: Patient tolerated treatment well  Strengths: Support and attitude of living partners  Barriers to Participation: Comorbidities, Ability to acquire knowledge  Plan:  Treatment Interventions: ADL retraining, Visual perceptual retraining, UE strengthening/ROM, Functional transfer training, Endurance training, Cognitive reorientation, Patient/family training, Equipment evaluation/education, Neuromuscular reeducation, Fine motor coordination activities, Compensatory technique education  OT Frequency: 4 times per week  OT Discharge Recommendations: Moderate intensity level of continued care  Equipment Recommended upon Discharge: Wheeled walker, Wheelchair  OT Recommended Transfer Status: Assist of 2  OT - OK to Discharge: Yes  Treatment Interventions: ADL retraining, Visual perceptual retraining, UE strengthening/ROM, Functional transfer training, Endurance training, Cognitive reorientation, Patient/family training, Equipment evaluation/education, Neuromuscular reeducation, Fine motor coordination activities, Compensatory technique education    Subjective   Current Problem:  1. Hypokalemia        2. Recurrent falls        3. Weakness          General:   OT Received On: 01/03/25  General  Reason for Referral: decline in ADLs, hypokalemia, falls  Referred By: Brooke Dietz MD  Past Medical History Relevant to Rehab: progressive supranuclear palsy, cognitive impairment, double vision, hematuria, GERD, tachycardia, PNA, spinal stenosis, gait instability  Family/Caregiver Present: Yes  Caregiver Feedback: spouse, supportive  Prior to Session Communication: Bedside nurse  Patient Position Received: Bed, 3 rail up, Alarm off, not on at start of session, Alarm off, caregiver present  Preferred Learning Style: verbal, auditory  General Comment: Patient is a 69 year old male who presented to the ED with  c/o falls. Per EMR, patient has fallen 4 times in the last 2 days. CT of the head completed: No depressed skull fracture. No acute intracranial bleed or focal  mass effect. CT of the cervical spine completed indicating: Cervical spine DJD throughout as described. No CT evidence of  cervical spine fracture. Patient is admitted with hypokalemia, primary HTN, frequent falls, other chronic pain, cognitive imapirment, BPH. Patient is cleared by nursing for therapy. Patient in bed upon arrival and agreeable to participate. Spouse at bedside. + purewick   Precautions:  Hearing/Visual Limitations: appears to have some visual neglect on left side, patient's spouse reports he is losing his vision. patient does wear glasses  Medical Precautions: Fall precautions    Vital Sign (Past 2hrs)                 Pain:  Pain Assessment  Pain Assessment: 0-10  0-10 (Numeric) Pain Score: 9  Pain Type: Chronic pain  Pain Location: Back  Pain Orientation: Lower  Pain Interventions: Repositioned, Ambulation/increased activity  Response to Interventions: Decrease in pain    Objective   Cognition:  Overall Cognitive Status: Impaired at baseline (hx of cognitive impairment)  Orientation Level: Disoriented to time, Disoriented to situation  Cognition Comments: patient able to tell me his name and that he is in the hospital. he appears plesantly confused. requires increased cues to follow simple one step motor commands  Safety/Judgement:  (decreased)  Insight: Moderate, Severe  Impulsive: Mildly  Processing Speed: Delayed           Home Living:  Type of Home: Apartment  Lives With: Spouse  Home Adaptive Equipment: Cane (rollator)  Home Layout: One level  Home Access: Stairs to enter with rails  Entrance Stairs-Number of Steps: 2 steps with 1 railing  Bathroom Shower/Tub: Tub/shower unit  Bathroom Toilet: Handicapped height  Bathroom Equipment: Grab bars in shower, Shower chair with back  Bathroom Accessibility: patient's spouse reports she  assists with showers  Home Living Comments: patient lives in an apartment with his wife. his wife does work 3 days a week. when she is at work, they do have an aide come in to assist. patient is left alone ~2 hours on those days. has a brief on and wife will assist with changing him when she returns home. patient with a hx of multiple falls  Prior Function:  Level of [place]: Needs assistance with ADLs, Needs assistance with homemaking  Receives Help From: Family, Other (Comment) (has an aide come3 days a week while spouse is at work, however, patient is left alone ~2 hours those days)  ADL Assistance: Needs assistance (spouse/aide assist. uses briefs)  Homemaking Assistance: Needs assistance (spouse manages)  Ambulatory Assistance: Needs assistance (using 2WW, only ambulates when someone is around per spouse)  IADL History:  Homemaking Responsibilities: No  IADL Comments: spouse manages  ADL:  Eating Assistance: Minimal  Eating Deficit: Setup, Supervision/safety, Increased time to complete (per clinical judgement)  Grooming Assistance: Minimal  Grooming Deficit: Steadying, Supervision/safety, Increased time to complete, Verbal cueing (per clinical judgement)  Bathing Assistance: Maximal  Bathing Deficit: Setup, Steadying, Verbal cueing, Supervision/safety, Increased time to complete , Perineal area, Buttocks, Right upper leg, Left upper leg, Right lower leg including foot, Left lower leg including foot (per clinical judgement)  UE Dressing Assistance: Maximal  UE Dressing Deficit: Thread RUE, Thread LUE, Pull over head, Pull around back, Pull down in back  LE Dressing Assistance: Total  LE Dressing Deficit: Don/doff R sock, Don/doff L sock  Toileting Assistance with Device: Total  Toileting Deficit:  (purewick)  Activities of Daily Living: Feeding  Feeding Level of Assistance: Minimum assistance  Feeding Where Assessed: Chair  Feeding Comments: all items within reach    UE Dressing  UE Dressing Level of  Assistance: Maximum assistance  UE Dressing Where Assessed: Bed level  UE Dressing Comments: don gown    LE Dressing  LE Dressing: Yes  Sock Level of Assistance: Dependent  LE Dressing Where Assessed: Bed level  LE Dressing Comments: don socks    Toileting  Toileting Comments: dereck  Activity Tolerance:  Endurance: Decreased tolerance for upright activites  Activity Tolerance Comments: fair tolerance  Functional Standing Tolerance:  Time: ~15 seconds  Activity: Max Ax2  Functional Standing Tolerance Comments: arm in arm assist  Bed Mobility/Transfers: Bed Mobility  Bed Mobility: Yes  Bed Mobility 1  Bed Mobility 1: Supine to sitting  Level of Assistance 1: Maximum assistance  Bed Mobility Comments 1: via log roll. education on step by step cues for log roll technique. assist to roll, move B LE off bed, and raise trunk up. head of bed elevated  Bed Mobility 2  Bed Mobility  2: Scooting  Level of Assistance 2: Maximum assistance  Bed Mobility Comments 2: with use of draw sheet to scoot patient's buttocks towards EOB to get feet flat on the floor    Transfers  Transfer: Yes  Transfer 1  Transfer From 1: Bed to  Transfer to 1: Stand  Technique 1: Sit to stand  Transfer Device 1: Walker  Transfer Level of Assistance 1: Maximum assistance, +2  Trials/Comments 1: despite Max Ax2 and patient pulling up from the walker, he is unable to acheive a stand and clear buttocks from bed. patient becomes very posterior on his feet and he is unable to raise his buttocks  Transfers 2  Transfer From 2: Bed to  Transfer to 2: Chair with arms  Technique 2: Sit to stand, Stand pivot  Transfer Level of Assistance 2: Maximum assistance, +2, Arm in arm assistance  Trials/Comments 2: arm in arm assist with Max Ax2 to assist patient to stand from EOB and pivot to the chair    Sitting Balance:  Static Sitting Balance  Static Sitting-Balance Support: Feet supported, Bilateral upper extremity supported  Static Sitting-Level of Assistance: Close  "supervision  Standing Balance:  Static Standing Balance  Static Standing-Balance Support: Bilateral upper extremity supported  Static Standing-Level of Assistance: Maximum assistance (x2)  Static Standing-Comment/Number of Minutes: arm in arm assist    Therapy/Activity: Therapeutic Activity  Therapeutic Activity Performed: Yes  Therapeutic Activity 1: patient txfers from supine to EOB with Max A. he is educated on log roll technique with step by step cues provided. patient requires assist to roll onto side, move B LE off bed, and raise trunk up. he sits EOB ~8 minutes with Fair balance and Close Supervision for safety. Patient completes sit to stand txfer from EOB to 2WW with Max Ax2. Depsite Max Ax2, patient unable to clear buttocks from bed and becomes very rigid overall. Patient then is returned to a sitting position. Max Ax2 with arm in arm assist complete to stand pivot patient from EOB to the chair. cues for safety throughout    Vision:Vision - Basic Assessment  Current Vision: Wears glasses all the time   and Vision - Complex Assessment  Vision Comments: spouse reports patient is \"losing his vision\" patient appears to have some left sided visual neglect. difficulty scanning  Sensation:  Sensation Comment: patient reports intermintent numbness/tingling in hands and feet  Strength:  Strength Comments: B UE 3-/5 grossly  Coordination:  Movements are Fluid and Coordinated: No  Upper Body Coordination: decreased, tremulous at times  Coordination Comment: decreased rate/accuracy of movements overall. effortful, tremulous at times   Hand Function:  Hand Function  Gross Grasp: Functional  Coordination: Functional  Extremities: RUE   RUE : Exceptions to WFL (generalized weakness) and LUE   LUE: Exceptions to WFL (generalized weakness)    Outcome Measures: [facility] Daily Activity  Putting on and taking off regular lower body clothing: Total  Bathing (including washing, rinsing, drying): A lot  Putting on and taking off " regular upper body clothing: A lot  Toileting, which includes using toilet, bedpan or urinal: Total  Taking care of personal grooming such as brushing teeth: A lot  Eating Meals: A little  Daily Activity - Total Score: 11    Education Documentation  Body Mechanics, taught by Urmila Braga OT at 1/3/2025  9:29 AM.  Learner: Patient  Readiness: Acceptance  Method: Explanation, Demonstration  Response: Needs Reinforcement    Precautions, taught by Urmila Braga OT at 1/3/2025  9:29 AM.  Learner: Patient  Readiness: Acceptance  Method: Explanation, Demonstration  Response: Needs Reinforcement    ADL Training, taught by Urmila Braga OT at 1/3/2025  9:29 AM.  Learner: Patient  Readiness: Acceptance  Method: Explanation, Demonstration  Response: Needs Reinforcement    Education Comments  No comments found.      Goals:  Encounter Problems       Encounter Problems (Active)       OT Goals       UB ADLs (Progressing)       Start:  01/03/25    Expected End:  01/31/25       Patient will complete UB ADL tasks, with supervision, using AE need in order to increase patient's safety and independence with self-care tasks.          LB ADLs (Progressing)       Start:  01/03/25    Expected End:  01/31/25       Patient will complete LB ADL tasks, with moderate assist, using AE need in order to increase patient's safety and independence with self-care tasks.          Functional Transfers (Progressing)       Start:  01/03/25    Expected End:  01/31/25       Patient will complete functional transfers with stand by assist using least restrictive device, in order to increase patient's safety and independence with daily tasks.          Safety (Progressing)       Start:  01/03/25    Expected End:  01/31/25       Patient will demonstrate the ability to follow simple, one step commands, 50% of the time, in order to increase safety and independence with daily tasks.         Activity Tolerance  (Progressing)       Start:  01/03/25    Expected  End:  01/31/25       Patient will demonstrate the ability to participate in functional activity at least >/= 25 minutes in order to increase patient's safety and independence with daily tasks.

## 2025-01-03 NOTE — PROGRESS NOTES
01/03/25 1623   Discharge Planning   Expected Discharge Disposition SNF  (WCP)   Has discharge transport been arranged? Yes   What day is the transport expected? 01/03/25   What time is the transport expected? 1800     7000 Completed   AVS and goldenrod sent to facility   VM left for wife to update   RN made aware and given report number

## 2025-01-03 NOTE — CARE PLAN
The patient's goals for the shift include no pain    The clinical goals for the shift include Maintain safety and free of falls during shift

## 2025-01-03 NOTE — CARE PLAN
Phoned pts wife Libertad at [phone]; no answer, message left informing Libertad that [facility] has accepted her  and that he will be leaving today at 6pm; phoned her husbands number (h) at [phone], unable to leave a message, the mailbox is full  Care Coordinator Gia Hanson, notified the floor nurse of pts discharge and time.  16:30 Called nurse Batsheva and informed her of pts discharge and that a message was left on the wifes phone; unable to directly contact her.      DISCHARGE PLAN: [facility] TODAY AT 6PM

## 2025-01-03 NOTE — NURSING NOTE
Assumed care of patient, patient is sitting on side of bed with bed alarm on and call light and possessions within reach.

## 2025-01-03 NOTE — PROGRESS NOTES
"Karl Lopez is a 69 y.o. male on day 1 of admission presenting with Hypokalemia.      Subjective   States he feels \"the usual\" this morning. Reporting 9/10 back pain which is chronic for him. Tolerating PO but appetite is poor.        Objective     Last Recorded Vitals  /83 (BP Location: Left arm, Patient Position: Lying)   Pulse 69   Temp 36.2 °C (97.2 °F) (Oral)   Resp 18   Wt 81.8 kg (180 lb 5.4 oz)   SpO2 99%   Intake/Output last 3 Shifts:    Intake/Output Summary (Last 24 hours) at 1/3/2025 0919  Last data filed at 1/3/2025 0200  Gross per 24 hour   Intake 240 ml   Output 300 ml   Net -60 ml       Admission Weight  Weight: 81.8 kg (180 lb 5.4 oz) (01/02/25 1053)    Daily Weight  01/02/25 : 81.8 kg (180 lb 5.4 oz)    Image Results  ECG 12 lead  Poor data quality, interpretation may be adversely affected  Normal sinus rhythm  Nonspecific ST abnormality  Abnormal ECG  When compared with ECG of 30-DEC-2024 19:16, (unconfirmed)  No significant change was found  CT head wo IV contrast  Narrative: Interpreted By:  Thomas Talavera,   STUDY:  CT HEAD WO IV CONTRAST;  1/2/2025 12:57 pm      INDICATION:  Signs/Symptoms:fall.      COMPARISON:  Comparison study is from 12/07/2024..      ACCESSION NUMBER(S):  [id number]      ORDERING CLINICIAN:  DOMENICA COLORADO      TECHNIQUE:  Routine axial images were obtained from the skull base through the  vertex.  Sagittal and coronal reconstruction images were generated.  Brain, subdural, and bone windows were reviewed. N/A   N/A      FINDINGS:  INTRACRANIAL:  Mild prominence of ventricles and sulci. There is mild patchy  hypodensity throughout the deep periventricular white matter. No  acute intracranial bleed, midline shift, or focal mass effect. No  destructive bone lesion. No depressed skull fracture. Skullbase  arterial calcifications in the carotid siphons and vertebral arteries.      EXTRACRANIAL:  Persistent opacification of the left maxillary sinus with " partially  calcified material. Stable osseous wall thickening of the left  maxillary sinus. Scant mucosal thickening in several bilateral  ethmoid air cells. Otherwise, the dot visualized paranasal sinuses  were clear. Visualized mastoid air cells were clear.      Impression: No depressed skull fracture. No acute intracranial bleed or focal  mass effect.      Mild volume loss.      Mild chronic white matter ischemic disease in the deep  periventricular regions.      Stable chronic left maxillary sinusitis.      MACRO:  None      Signed by: Thomas Talavera 1/2/2025 1:35 PM  Dictation workstation:   [id number]  CT lumbar spine wo IV contrast  Narrative: Interpreted By:  Thomas Talavera,   STUDY:  CT LUMBAR SPINE WO IV CONTRAST;  1/2/2025 12:57 pm      INDICATION:  Signs/Symptoms:back pain s/p fall.      COMPARISON:  Comparison study is from 12/07/2024..      ACCESSION NUMBER(S):  [id number]      ORDERING CLINICIAN:  DOMENICA COLORDAO      TECHNIQUE:  Thin section axial images were obtained from T11 down through the mid  sacrum. Sagittal and coronal reconstruction images were generated.  Soft tissue and bone windows were reviewed.      FINDINGS:  VERTEBRAL BODIES AND POSTERIOR ELEMENTS: There are stable and intact  pedicle screws and fixation rods in place at L4 and L5. L4-5  laminectomy defect. There is fusion of the bodies of L4 and L5. There  is moderate disc space loss with endplate osteophytosis and vacuum  disc phenomenon at L3-4 and L4-5 with mild changes at L1-2. There is  mild-to-moderate disc space narrowing with endplate spurring in the  imaged distal thoracic spine. Grade 1 retrolisthesis of L2 over L3  and of L3 over L4. Interfacet hypertrophy with spurring at L5-S1.  There is slight levoscoliosis centered at L3. No compression fracture  or posterior element fracture.  No destructive bone lesion. There are  remote surgical changes involving the lateral aspect of the left  iliac wing. There are sclerotic  arthritic changes in both SI joints.      SPINAL CANAL: Mild circumferential spinal stenosis caused by  arthritic changes described in addition to ligamentum flavum  hypertrophy and mild posterior disc bulge. No gross exiting nerve  root compression. There is mild-to-moderate circumferential spinal  stenosis at L3-4 caused by arthritic changes described in addition to  posterior disc bulge and ligamentum flavum hypertrophy. There is  left-sided foraminal narrowing at L3-4. No gross focal disc  herniation in this unenhanced exam.      SOFT TISSUES: Within normal limits.      ABDOMEN/PELVIS: The imaged portions of the abdomen and pelvis were  negative.      Impression: Surgical and arthritic changes in the lumbosacral spine as described.  Please see above for details.      MACRO:  None      Signed by: Thomas Talavera 1/2/2025 1:32 PM  Dictation workstation:   [id number]  CT cervical spine wo IV contrast  Narrative: Interpreted By:  Thomas Talavera,   STUDY:  CT CERVICAL SPINE WO IV CONTRAST;  1/2/2025 12:57 pm      INDICATION:  Signs/Symptoms:fall.          COMPARISON:  Prior exam is from 12/07/2024.      ACCESSION NUMBER(S):  [id number]      ORDERING CLINICIAN:  DOMENICA COLORADO      TECHNIQUE:  Thin section axial images were obtained from the skull base down  through the thoracic inlet. Sagittal and coronal reconstruction  images were generated. Soft tissue, lung, and bone windows were  reviewed.      FINDINGS:  VERTEBRAL BODIES AND POSTERIOR ELEMENTS:  Joint space loss with spurring in the anterior superior aspect of the  atlantoaxial joint. Thickening and calcification of the transverse  ligament posterior to the dens. Mild endplate osteophytosis at C2-3.  There is mild-to-moderate disc space narrowing with endplate  osteophytosis from C3-4 through T1-2. Multilevel interfacet  hypertrophy with spur formation. Multilevel uncovertebral hypertrophy  with spur formation. No cervical spine compression fracture.  No  posterior element fracture.  No destructive bone lesion.  No listhesis.      SPINAL CANAL: No gross disc herniation.      NECK SOFT TISSUES: Within normal limits.      LUNG APICES: Imaged portion of the lung apices are within normal  limits.      SKULL BASE: Within normal limits.      Impression: Cervical spine DJD throughout as described. No CT evidence of  cervical spine fracture in this exam.      MACRO:  None      Signed by: Thomas Talavera 1/2/2025 1:24 PM  Dictation workstation:   [id number]      Physical Exam  Constitutional:       General: He is not in acute distress.     Appearance: He is not toxic-appearing.   HENT:      Head: Normocephalic.      Mouth/Throat:      Pharynx: Oropharynx is clear.   Eyes:      General: No scleral icterus.  Cardiovascular:      Rate and Rhythm: Normal rate.   Pulmonary:      Effort: No respiratory distress.      Breath sounds: No wheezing.   Abdominal:      General: There is no distension.      Palpations: Abdomen is soft.   Musculoskeletal:      Right lower leg: No edema.      Left lower leg: No edema.   Neurological:      Mental Status: He is alert.   Psychiatric:         Behavior: Behavior normal.         Relevant Results               Assessment/Plan        Assessment & Plan  Frequent falls  Suspect mechanical due to chronic pain and debility  However, given reports of intermittent dizziness will also check orthostats  PT/OT  TCC/SWK consult for placement   Fall precautions  Agreeable to SNF placement   Hypokalemia  Replace PRN  Resolved   Primary hypertension  Continue home amlodipine and lisinopril with hold parameters  Other chronic pain  Continue home gabapentin and percocet PRN  BPH (benign prostatic hyperplasia)  Continue home finasteride  Cognitive impairment  With depression  Currently A&Ox4  Continue home zoloft     Plan:  Dietician consult for nutrition assessment given poor appetite/PO intake  PT/OT/OOB  Medically cleared for discharge   Anticipate discharge to  SNF pending facility acceptance and precert               Brooke Dietz MD

## 2025-01-03 NOTE — NURSING NOTE
Called wife Libertad, no answer left message about patient being discharged to [facility] at 6PM tonight, left my call back number and then called [facility] and left message on their messing machine that patient was being picked up here at 6P and left me call back number and name.

## 2025-01-03 NOTE — DISCHARGE SUMMARY
Discharge Diagnosis  Hypokalemia    Issues Requiring Follow-Up  Frequent falls, chronic pain - follow up with PCP     Discharge Meds     Medication List      START taking these medications     hydrOXYzine HCL 10 mg tablet; Commonly known as: Atarax; Take 1 tablet   (10 mg) by mouth every 8 hours if needed for anxiety.     CONTINUE taking these medications     amLODIPine 10 mg tablet; Commonly known as: Norvasc; Take 1 tablet (10   mg) by mouth once daily.   ascorbic acid 500 mg tablet; Commonly known as: Vitamin C   cholecalciferol 25 MCG (1000 UT) tablet; Commonly known as: Vitamin D-3   finasteride 5 mg tablet; Commonly known as: Proscar; Take 1 tablet (5   mg) by mouth once daily. Do not crush, chew, or split.   gabapentin 100 mg capsule; Commonly known as: Neurontin   metoprolol succinate  mg 24 hr tablet; Commonly known as:   Toprol-XL; Take 1 tablet (100 mg) by mouth 2 times a day.   oxyCODONE-acetaminophen  mg tablet; Commonly known as: Percocet   ramipril 10 mg capsule; Commonly known as: Altace; Take 2 capsules (20   mg) by mouth once daily.   sertraline 50 mg tablet; Commonly known as: Zoloft; Take 2 tablets (100   mg) by mouth once daily.   sildenafil 100 mg tablet; Commonly known as: Viagra; Take 1 tablet (100   mg) by mouth if needed for erectile dysfunction.       Test Results Pending At Discharge  Pending Labs       No current pending labs.            Hospital Course   69yoM hx of chronic pain, HTN, BPH, and cognitive impairment who presented with frequent falls and difficulty caring for himself at home. Patient reported 4 mechanical falls in the last 2 days. Patient's wife also reporting difficulty caring for him at home. Requesting SNF placement. Admitted for therapy eval and SNF placement. PT/OT recommending moderate intensity rehab. Patient is now medically cleared for discharge to SNF. Recommend follow up with PCP in 1 week for a hospital follow up visit.     Pertinent Physical Exam  At Time of Discharge  Physical Exam  Constitutional:       General: He is not in acute distress.     Appearance: He is ill-appearing (chronically). He is not toxic-appearing.   HENT:      Head: Normocephalic.      Mouth/Throat:      Pharynx: Oropharynx is clear.   Eyes:      General: No scleral icterus.  Cardiovascular:      Rate and Rhythm: Normal rate.   Pulmonary:      Effort: No respiratory distress.      Breath sounds: No wheezing.   Abdominal:      General: There is no distension.      Palpations: Abdomen is soft.   Musculoskeletal:      Right lower leg: No edema.      Left lower leg: No edema.   Neurological:      Mental Status: He is alert and oriented to person, place, and time.         Outpatient Follow-Up  Future Appointments   Date Time Provider Department Center   2/12/2025  2:00 PM Nnamdi Serrano MD [id number]     Time spent on discharge: 35 minutes    Brooke Dietz MD

## 2025-01-03 NOTE — PROGRESS NOTES
01/03/25 1048   Physical Activity   On average, how many days per week do you engage in moderate to strenuous exercise (like a brisk walk)? 0 days   On average, how many minutes do you engage in exercise at this level? 0 min   Financial Resource Strain   How hard is it for you to pay for the very basics like food, housing, medical care, and heating? Not hard   Housing Stability   In the last 12 months, was there a time when you were not able to pay the mortgage or rent on time? N   In the past 12 months, how many times have you moved where you were living? 0   At any time in the past 12 months, were you homeless or living in a shelter (including now)? N   Transportation Needs   In the past 12 months, has lack of transportation kept you from medical appointments or from getting medications? no   In the past 12 months, has lack of transportation kept you from meetings, work, or from getting things needed for daily living? No   Food Insecurity   Within the past 12 months, you worried that your food would run out before you got the money to buy more. Never true   Stress   Do you feel stress - tense, restless, nervous, or anxious, or unable to sleep at night because your mind is troubled all the time - these days? Not at all   Social Connections   In a typical week, how many times do you talk on the phone with family, friends, or neighbors? Twice a week   How often do you get together with friends or relatives? Once   How often do you attend Jain or Moravian services? More than 4  (wife Libertad said that they stream services)   Do you belong to any clubs or organizations such as Jain groups, unions, fraternal or athletic groups, or school groups? No   How often do you attend meetings of the clubs or organizations you belong to? Never   Are you , , , , never , or living with a partner?    Intimate Partner Violence   Within the last year, have you been afraid of your partner  or ex-partner? No   Within the last year, have you been humiliated or emotionally abused in other ways by your partner or ex-partner? No   Within the last year, have you been kicked, hit, slapped, or otherwise physically hurt by your partner or ex-partner? No   Within the last year, have you been raped or forced to have any kind of sexual activity by your partner or ex-partner? No   Alcohol Use   Q1: How often do you have a drink containing alcohol? Never   Q2: How many drinks containing alcohol do you have on a typical day when you are drinking? None   Q3: How often do you have six or more drinks on one occasion? Never   Utilities   In the past 12 months has the electric, gas, oil, or water company threatened to shut off services in your home? No   Health Literacy   How often do you need to have someone help you when you read instructions, pamphlets, or other written material from your doctor or pharmacy? Always  (pt has dementia)

## 2025-01-03 NOTE — CARE PLAN
"Pt has a Living Will and his wife Libertad will bring the document to the floor  ADOD: 3 days    Pt lives at home with his wife Libertad, who is the caretaker  Pt has a hx of dementia and needs assistance with ADL's including bathing and dressing  He uses a rollator and he has had multiple falls  He has a hx of depression; wife said no S.I.  He wears glasses, no hearing aids.   He does not use home 02/cpap/bipap/nebulizer.   PCP is Dr. Luo; they use [facility] their pharmacy and they can afford the meds  OT assessed pt today, [facility] is \"11\". Waiting for PT's assessment  Discussed discharge planning. Wife would like WCP for rehab  Will send the referral    DISCHARGE PLAN: SNF--REFERRAL SENT TO [facility]--DO NOT DISCHARGE PATIENT BEFORE SPEAKING WITH PATIENT; NEED ACCEPTING FACILITY; WILL NEED PRECERT    "

## 2025-01-03 NOTE — PROGRESS NOTES
01/03/25 1315   Discharge Planning   Expected Discharge Disposition SNF  (WCP)     Patient accepted by [facility] at this time   Patient will need precert once therapy eval is complete     Update 1600: Nila Precert Status: Approved  Auth. # [id number]  Dates: 1/03/2025 - 1/09/2025     ** do not discharge without speaking to care coordination**

## 2025-01-03 NOTE — PROGRESS NOTES
01/03/25 1052   [facility] Disability Status   Are you deaf or do you have serious difficulty hearing? N   Are you blind or do you have serious difficulty seeing, even when wearing glasses? N   Because of a physical, mental, or emotional condition, do you have serious difficulty concentrating, remembering, or making decisions? (5 years old or older) Y   Do you have serious difficulty walking or climbing stairs? Y   Do you have serious difficulty dressing or bathing? Y   Because of a physical, mental, or emotional condition, do you have serious difficulty doing errands alone such as visiting the doctor? Y

## 2025-01-03 NOTE — ACP (ADVANCE CARE PLANNING)
Patient's wife, Libertad, is present at bedside. Libertad states that she is HCPOA.     Code status discussed with patient and his wife - DNRCCA, DNI. EMR updated

## 2025-01-03 NOTE — NURSING NOTE
Removed patients IV on avatar, but I removed it on the wrong patient. I went back in and put it back on avatar.

## 2025-01-03 NOTE — CONSULTS
"Nutrition Assessement Note    Nutrition Assessment    Reason for Assessment: Provider consult order    Reason for Hospital Admission:  Karl Lopez is a 69 y.o. male who is admitted for hypokalemia. Spoke with pt at bedside. Pt reported good PO intake and appetite. Documented PO intake is poor. Will provide Ensure plus high protein BID to increase PO intake.     Malnutrition Screening Tool (MST)  Have you recently lost weight without trying?: No  If yes, how much weight have you lost?: Unsure  Weight Loss Score: 0  Have you been eating poorly because of a decreased appetite?: No  Malnutrition Score: 0  Nutrition Screen  Stage 3 or 4 Pressure Injury or Multiple Non-Healing Wounds: No  Home Tube Feeding or Total Parenteral Nutrition (TPN): No  Dietitian Consult Needed: No    No past medical history on file.   No past surgical history on file.    Nutrition History:  Food and Nutrient History: Pt reported good PO intake/ appetite. Per MD, appetite is poor.  Energy Intake: Poor < 50 %     Food Allergies/Intolerances:  None  GI Symptoms: None  Oral Problems: None    Anthropometrics:  Ht: 177.8 cm (5' 10\"), Wt: 81.8 kg (180 lb 5.4 oz), BMI: 25.88             Weight Change:  Daily Weight  01/02/25 : 81.8 kg (180 lb 5.4 oz)  12/30/24 : 79.4 kg (175 lb)  12/18/24 : 76.3 kg (168 lb 3.4 oz)  12/07/24 : 73.9 kg (163 lb)  10/01/24 : 73.9 kg (163 lb)  09/19/24 : 81.6 kg (180 lb)  07/31/24 : 75.3 kg (166 lb)  07/24/24 : 73.9 kg (163 lb)  07/02/24 : 81.6 kg (180 lb)  01/17/24 : 76.9 kg (169 lb 8.5 oz)     Weight History / % Weight Change: Pt reported that his weight fluctuates 10#             Nutrition Focused Physical Exam Findings:                       Nutrition Significant Labs:  Lab Results   Component Value Date    WBC 8.8 01/03/2025    HGB 15.4 01/03/2025    HCT 43.9 01/03/2025     01/03/2025    CHOL 153 10/11/2021    TRIG 167 (H) 05/20/2019    HDL 49.6 10/11/2021    ALT 20 01/02/2025    AST 43 (H) 01/02/2025    "  01/03/2025    K 4.2 01/03/2025     01/03/2025    CREATININE 0.56 01/03/2025    BUN 8 01/03/2025    CO2 28 01/03/2025    TSH 0.84 06/30/2022    PSA 0.79 11/12/2024     Nutrition Specific Medications:  amLODIPine, 10 mg, oral, Daily  enoxaparin, 40 mg, subcutaneous, Daily  finasteride, 5 mg, oral, Daily  lisinopril, 40 mg, oral, Daily  metoprolol succinate XL, 100 mg, oral, BID  polyethylene glycol, 17 g, oral, Daily  sertraline, 100 mg, oral, Daily             Dietary Orders (From admission, onward)       Start     Ordered    01/03/25 0244  May Participate in Room Service  ( ROOM SERVICE MAY PARTICIPATE)  Once        Question:  .  Answer:  Yes    01/03/25 0243    01/02/25 1446  Adult diet Regular  Diet effective now        Question:  Diet type  Answer:  Regular    01/02/25 1445                    Estimated Needs:   Estimated Energy Needs  Total Energy Estimated Needs (kCal): 2045 kCal  Total Estimated Energy Need per Day (kCal/kg): 25 kCal/kg  Method for Estimating Needs: actual wt    Estimated Protein Needs  Total Protein Estimated Needs (g): 82 g  Total Protein Estimated Needs (g/kg): 1 g/kg  Method for Estimating Needs: actual wt    Estimated Fluid Needs  Total Fluid Estimated Needs (mL): 2045 mL  Method for Estimating Needs: 1 mL/kcal        Nutrition Diagnosis   Nutrition Diagnosis:       Nutrition Diagnosis  Patient has Nutrition Diagnosis: Yes  Diagnosis Status (1): New  Nutrition Diagnosis 1: Inadequate energy intake  Related to (1): decreased ability to consume sufficient energy  As Evidenced by (1): poor PO intake       Nutrition Interventions/Recommendations   Nutrition Interventions and Recommendations:    Nutrition Prescription:  Individualized Nutrition Prescription Provided for : 2045 kcals, 82 g protein via diet    Nutrition Interventions:   Food and/or Nutrient Delivery Interventions  Interventions: Meals and snacks, Medical food supplement  Meals and Snacks: General healthful  diet  Goal: provide diet as ordered  Medical Food Supplement: Commercial beverage  Goal: ensure plus high protein BID to provide 350 kcals and 20g protein each    Education Documentation  No documentation found.           Nutrition Monitoring and Evaluation   Monitoring/Evaluation:   Food/Nutrient Related History Monitoring  Monitoring and Evaluation Plan: Energy intake  Energy Intake: Estimated energy intake  Criteria: pt to consume >/= 75% estimated needs            Time Spent/Follow-up:   Follow Up  Time Spent (min): 30 minutes  Last Date of Nutrition Visit: 01/03/25  Nutrition Follow-Up Needed?: 5-7 days  Follow up Comment: 1/8/25

## 2025-01-03 NOTE — NURSING NOTE
Due to patient's poor attention and concentration; patient's spouse Libertad Lopez was contacted at this time at [phone] to complete admission assessment. No answer

## 2025-01-03 NOTE — PROGRESS NOTES
Physical Therapy    Physical Therapy Evaluation & Treatment    Patient Name: Karl Lopez  MRN: [id number]  Department: [address]  Room: [facility]  Today's Date: 1/3/2025   Time Calculation  Start Time: 0915  Stop Time: 0945  Time Calculation (min): 30 min    Assessment/Plan   PT Assessment  PT Assessment Results: Decreased strength, Decreased endurance, Decreased range of motion, Impaired balance, Decreased mobility, Decreased coordination, Decreased cognition, Impaired judgement, Decreased safety awareness  Rehab Prognosis: Fair  Barriers to Discharge Home: Caregiver assistance, Cognition needs, Physical needs  Caregiver Assistance: Caregiver assistance needed per identified barriers - however, level of patient's required assistance exceeds assistance available at home  Cognition Needs: 24hr supervision for safety awareness needed, Insight of patient limited regarding functional ability/needs, Cognition-related high falls risk  Physical Needs: Ambulating household distances limited by function/safety, 24hr mobility assistance needed, 24hr ADL assistance needed, High falls risk due to function or environment  Evaluation/Treatment Tolerance: Patient limited by fatigue  Medical Staff Made Aware: Yes  Strengths: Premorbid level of function  Barriers to Participation: Ability to acquire knowledge, Comorbidities, Insight into problems  End of Session Communication: Bedside nurse  Assessment Comment: pt with decreased functional mobility, increased weakness, impaired tolerance to activity, and poor safety awareness. pt is funcitoning below his baseline and will benefit from continued skilled therapy services to improve his functional performance and maximize safety.  End of Session Patient Position: Up in chair, Alarm on (needs in reach but unsure of patient's ability to use the call button)   IP OR SWING BED PT PLAN  Inpatient or Swing Bed: Inpatient  PT Plan  Treatment/Interventions: Bed mobility, Transfer training,  Gait training, Balance training, Strengthening, Endurance training, Range of motion, Therapeutic exercise, Therapeutic activity  PT Plan: Ongoing PT  PT Frequency: 3 times per week  PT Discharge Recommendations: Moderate intensity level of continued care  Equipment Recommended upon Discharge: Wheeled walker, Wheelchair  PT Recommended Transfer Status: Assist x2, Assistive device (rolling walker)  PT - OK to Discharge: Yes    Subjective   General Visit Information:  General  Reason for Referral: Impaired Mobility  Referred By: Brooke Dietz MD  Past Medical History Relevant to Rehab: progressive supranuclear palsy, cognitive impairment, double vision, hematuria, GERD, tachycardia, PNA, spinal stenosis, gait instability  Family/Caregiver Present: Yes  Caregiver Feedback: spouse present  Prior to Session Communication: Bedside nurse  Patient Position Received: Up in chair, Alarm on  Preferred Learning Style: verbal, auditory  General Comment: Patient is a 69 year old male who presented to the ED with c/o falls. Per EMR, patient has fallen 4 times in the last 2 days. CT of the head completed: No depressed skull fracture. No acute intracranial bleed or focal  mass effect. CT of the cervical spine completed indicating: Cervical spine DJD throughout as described. No CT evidence of  cervical spine fracture.  Home Living:  Home Living  Type of Home: Apartment  Lives With: Spouse  Home Adaptive Equipment: Cane, Walker rolling or standard (rollator)  Home Layout: One level  Home Access: Stairs to enter with rails  Entrance Stairs-Rails:  (unilateral)  Entrance Stairs-Number of Steps: 2  Bathroom Shower/Tub: Tub/shower unit  Bathroom Toilet: Handicapped height  Bathroom Equipment: Grab bars in shower, Shower chair with back  Bathroom Accessibility: patient's spouse reports she assists with showers  Home Living Comments: patient lives in an apartment with his wife. his wife does work 3 days a week. when she is at work, they do  have an aide come in to assist. patient is left alone ~2 hours on those days. has a brief on and wife will assist with changing him when she returns home. patient with a hx of multiple falls  Prior Level of Function:  Prior Function Per Pt/Caregiver Report  Level of Adger: Needs assistance with ADLs, Needs assistance with functional transfers, Needs assistance with homemaking  Receives Help From: Family, Personal care attendant (has an aide 3 days/week while spouse is at work, however, patient is left alone ~2 hours those days)  ADL Assistance:  (spouse/aide assist. uses briefs)  Homemaking Assistance:  (spouse manages)  Ambulatory Assistance:  (using 2WW, only ambulates when someone is around per spouse)  Precautions:  Precautions  Hearing/Visual Limitations: wears glasses, poor vision  Medical Precautions: Fall precautions    Objective   Pain:  Pain Assessment  Pain Assessment: FLACC (Face, Legs, Activity, Cry, Consolability)  0-10 (Numeric) Pain Score: 0 - No pain (pt states he has pain but did not indicate location or severity, no nonverbal cues of pain noted)  Cognition:  Cognition  Overall Cognitive Status: Impaired, Impaired at baseline  Arousal/Alertness: Delayed responses to stimuli  Orientation Level: Disoriented to time, Disoriented to situation  Following Commands: Follows one step commands with repetition  Cognition Comments: flat affect, poor eye contact, unintelligible speech    General Assessments:  General Observation  General Observation: pt sitting in chair upon arrival, purewick in place    Activity Tolerance  Endurance: Decreased tolerance for upright activites  Activity Tolerance Comments: fair  Rate of Perceived Exertion (RPE): 3    Sensation  Sensation Comment: intermittent paresthesias B hands/feet    Strength  Strength Comments: >/=3-/5  Coordination  Coordination Comment: decreased rate and accuracy of movements, delay in response to questions/tasks, delayed motor planning, poor  movement quality    Postural Control  Posture Comment: forward head posture, rounded shoulders    Static Sitting Balance  Static Sitting-Balance Support: Bilateral upper extremity supported, Feet supported (good balance in the chair)  Static Sitting-Level of Assistance: Distant supervision  Static Sitting-Comment/Number of Minutes: good balance while seated in chair    Static Standing Balance  Static Standing-Balance Support: Bilateral upper extremity supported  Static Standing-Level of Assistance: Moderate assistance  Static Standing-Comment/Number of Minutes: fair- balance with static stance at the walker, retropulsive  Dynamic Standing Balance  Dynamic Standing-Balance Support: Bilateral upper extremity supported  Dynamic Standing-Level of Assistance: Maximum assistance  Dynamic Standing-Comments: poor balance with forward stepping with rolling walker  Functional Assessments:  ADL  ADL's Addressed: No    Bed Mobility  Bed Mobility: No    Transfers  Transfer: Yes  Transfer 1  Transfer From 1: Chair with arms to  Transfer to 1: Stand  Technique 1: Sit to stand  Transfer Device 1: Walker  Transfer Level of Assistance 1: Maximum assistance  Trials/Comments 1: pt pushing from arms of chair to stand, retropulsive when standing, first 2 attempts unsuccessful. constant cues for anterior weight shifting, max A with manual palcement of hands on walker, significantly increased time to compelte stand and establish COG over DIONISIO  Transfers 2  Transfer From 2: Stand to  Transfer to 2: Chair with arms  Technique 2: Stand to sit  Transfer Device 2: Walker  Transfer Level of Assistance 2: Maximum assistance  Trials/Comments 2: manual guidance of right hand back to chair, pt keeping left hand on walker, max A to guide hips to chair, pt keeping hips and knees extended, difficulty with initiaing flexion to sit down.    Ambulation/Gait Training  Ambulation/Gait Training Performed: Yes  Ambulation/Gait Training 1  Surface 1: Level  tile  Device 1: Rolling walker  Assistance 1: Moderate assistance  Quality of Gait 1: Narrow base of support, Diminished heel strike, Decreased step length, Forward flexed posture, Scissoring  Comments/Distance (ft) 1: amb 5 shuffled steps forward, narrow DIONISIO, feet touching each other, difficulty lifting each foot off the floor, shuffling feet forward a few inches with each step.  Extremity/Trunk Assessments:  RLE   RLE :  (poor knee extension in sitting, limited ankle ROM, +weakness)  LLE   LLE :  (poor knee extension in sitting, limited ankle ROM, +weakness)  Treatments:  Ambulation/Gait Training  Ambulation/Gait Training Performed: Yes  Ambulation/Gait Training 1  Surface 1: Level tile  Device 1: Rolling walker  Assistance 1: Moderate assistance  Quality of Gait 1: Narrow base of support, Diminished heel strike, Decreased step length, Forward flexed posture, Scissoring  Comments/Distance (ft) 1: amb 5 shuffled steps forward, narrow DIONISIO, feet touching each other, difficulty lifting each foot off the floor, shuffling feet forward a few inches with each step.  Transfers  Transfer: Yes  Transfer 1  Transfer From 1: Chair with arms to  Transfer to 1: Stand  Technique 1: Sit to stand  Transfer Device 1: Walker  Transfer Level of Assistance 1: Maximum assistance  Trials/Comments 1: pt pushing from arms of chair to stand, retropulsive when standing, first 2 attempts unsuccessful. constant cues for anterior weight shifting, max A with manual palcement of hands on walker, significantly increased time to compelte stand and establish COG over DIONISIO  Transfers 2  Transfer From 2: Stand to  Transfer to 2: Chair with arms  Technique 2: Stand to sit  Transfer Device 2: Walker  Transfer Level of Assistance 2: Maximum assistance  Trials/Comments 2: manual guidance of right hand back to chair, pt keeping left hand on walker, max A to guide hips to chair, pt keeping hips and knees extended, difficulty with initiaing flexion to sit  down.  Outcome Measures:  [facility] Basic Mobility  Turning from your back to your side while in a flat bed without using bedrails: A lot  Moving from lying on your back to sitting on the side of a flat bed without using bedrails: A lot  Moving to and from bed to chair (including a wheelchair): Total  Standing up from a chair using your arms (e.g. wheelchair or bedside chair): Total  To walk in hospital room: Total  Climbing 3-5 steps with railing: Total  Basic Mobility - Total Score: 8    Encounter Problems       Encounter Problems (Active)       PT STG Problem       Patient will roll right and left with supervision assist to facilitate mobility. (Progressing)       Start:  01/03/25    Expected End:  01/31/25            Patient will transfer supine to sit and sit to supine with minimal assist to facilitate mobility. (Progressing)       Start:  01/03/25    Expected End:  01/31/25            Patient will transfer sit to stand and stand to sit with minimal assist to facilitate mobility. (Progressing)       Start:  01/03/25    Expected End:  01/31/25            Patient will transfer bed to chair and chair to bed with minimal assist to facilitate mobility. (Progressing)       Start:  01/03/25    Expected End:  01/31/25            Patient will amb 20 feet rolling walker device including no turns on even surface with minimal assist to facilitate safe mobility.   (Progressing)       Start:  01/03/25    Expected End:  01/31/25            Patient will increase B LE strength to 4/5 to improve functional mobility.  (Progressing)       Start:  01/03/25    Expected End:  01/31/25                   Education Documentation  Mobility Training, taught by Evonne Rivera, PT at 1/3/2025  2:22 PM.  Learner: Significant Other, Patient  Readiness: Acceptance  Method: Explanation  Response: Verbalizes Understanding, Needs Reinforcement    Education Comments  Educated on PT POC

## 2025-01-06 LAB
ATRIAL RATE: 64 BPM
ATRIAL RATE: 71 BPM
P AXIS: 45 DEGREES
P AXIS: 48 DEGREES
P OFFSET: 187 MS
P OFFSET: 196 MS
P ONSET: 140 MS
P ONSET: 144 MS
PR INTERVAL: 150 MS
PR INTERVAL: 162 MS
Q ONSET: 215 MS
Q ONSET: 225 MS
QRS COUNT: 10 BEATS
QRS COUNT: 12 BEATS
QRS DURATION: 72 MS
QRS DURATION: 74 MS
QT INTERVAL: 388 MS
QT INTERVAL: 414 MS
QTC CALCULATION(BAZETT): 421 MS
QTC CALCULATION(BAZETT): 427 MS
QTC FREDERICIA: 410 MS
QTC FREDERICIA: 422 MS
R AXIS: -1 DEGREES
R AXIS: -5 DEGREES
T AXIS: 10 DEGREES
T AXIS: 16 DEGREES
T OFFSET: 409 MS
T OFFSET: 432 MS
VENTRICULAR RATE: 64 BPM
VENTRICULAR RATE: 71 BPM

## 2025-01-09 ENCOUNTER — NURSING HOME VISIT (OUTPATIENT)
Dept: POST ACUTE CARE | Facility: EXTERNAL LOCATION | Age: 70
End: 2025-01-09
Payer: MEDICARE

## 2025-01-09 DIAGNOSIS — D72.829 LEUKOCYTOSIS, UNSPECIFIED TYPE: Primary | ICD-10-CM

## 2025-01-09 DIAGNOSIS — G23.1: ICD-10-CM

## 2025-01-09 DIAGNOSIS — E87.6 HYPOKALEMIA: ICD-10-CM

## 2025-01-09 DIAGNOSIS — G89.29 OTHER CHRONIC PAIN: ICD-10-CM

## 2025-01-09 DIAGNOSIS — F33.9 RECURRENT MAJOR DEPRESSIVE DISORDER, REMISSION STATUS UNSPECIFIED (CMS-HCC): ICD-10-CM

## 2025-01-09 DIAGNOSIS — M62.81 GENERALIZED MUSCLE WEAKNESS: ICD-10-CM

## 2025-01-09 DIAGNOSIS — R26.89 IMPAIRED GAIT AND MOBILITY: ICD-10-CM

## 2025-01-09 DIAGNOSIS — R41.89 COGNITIVE IMPAIRMENT: ICD-10-CM

## 2025-01-09 DIAGNOSIS — E87.0 HYPERNATREMIA: ICD-10-CM

## 2025-01-09 DIAGNOSIS — I10 BENIGN ESSENTIAL HTN: ICD-10-CM

## 2025-01-09 DIAGNOSIS — R29.6 FREQUENT FALLS: ICD-10-CM

## 2025-01-09 DIAGNOSIS — N40.0 BENIGN PROSTATIC HYPERPLASIA, UNSPECIFIED WHETHER LOWER URINARY TRACT SYMPTOMS PRESENT: ICD-10-CM

## 2025-01-09 DIAGNOSIS — R74.8 ELEVATED LIVER ENZYMES: ICD-10-CM

## 2025-01-09 PROCEDURE — 99310 SBSQ NF CARE HIGH MDM 45: CPT | Performed by: NURSE PRACTITIONER

## 2025-01-09 NOTE — LETTER
Patient: Karl Lopez  : 1955    Encounter Date: 2025    Name: Karl Lopez    YOB: 1955    Code Status: DNRcc-arrest    Chief Complaint:  Initial visit; new patient;  Leukocytosis; etc.....       HPI   69 year old male with medical history of BPH, HTN, spinal stenosis, cognitive impairment, progressive supranuclear palsy.    HOSPITAL COURSE:  Patient presented to [facility] on 25 with frequent falls.   It was reported that he has had 4 falls in the last 2 days.   Most of the time the falls are mechanical due to slipping or tripping on things and   generalized weakness but does endorse some intermittent dizziness.   States he has had progressive weakness ongoing for the last several years.  He lives at home with his wife and she is having difficulty caring for him.  Denies recent illness and sick contacts.   Denies fever, chills, SOB, CP, abdominal pain, nausea/vomiting, dysuria, difficulty urinating, constipation, and diarrhea.   Denies tobacco use and illicit substance use.   Reports drinking 1 beer a night.   Denies hx of alcohol withdrawal.   In the ED his vital signs were stable.  Labs showing K 3.2, ALT 20, AST 43, otherwise wnl.   UA noninfectious.   CT head/cervical spine/lumbar spine performed showed chronic degenerative changes.      Patient was admitted to [facility] from 25 to 1/3/25 (31 hours)    Patient admitted to [facility] on 1/3/25 for skilled services.    Hospital and chronic diagnoses as follows:    Leukocytosis:  25 WBC 14.73 H  25 WBC 16.57 H  On Rocephin.  ON 1/3/25 in the hospital prior to admission WBC 8.8 wnl  UA C & S ordered, results pending.  No fevers reported.  Patient seen today.  He is in bed.  Calm, cooperative.  No complaints of chest pain.  No headaches or dizziness.  No cough reported.    Hypernatremia:  25 Na 145 H  25 Na 149 H   Has oral fluids 300 ml q shift ordered.    Hypokalemia:  Treated with  potassium chloride  Recent potassium levels:  1/7/25 K 3.5 L  1/9/25 K 4.7 (improved)    Elevated liver enzymes:  On 1/7/25 ALT 90 H;  H; alkaline phosphatase 126  On 1/9/25 ALT 63 H; AST (unavailable); alkaline phosphatase 110  Liver ultrasound ordered--results pending.    Frequent Falls:  Evaluated and treated in the hospital.  Suspected mechanical due to chronic pain and debility.  CT head/cervical spine/lumbar spine performed showed chronic degenerative changes.   PT/OT evaluated patient in the hospital, SNF recommended.  No falls reported since he admitted to [facility].    Primary hypertension:  On amlodipine, metoprolol and ramipril.  Recent BP: 121/64  No complaints of headaches or dizziness.  No chest pain.    Other chronic pain:  On gabapentin and percocet PRN.  No complaints of pain today.    BPH (benign prostatic hyperplasia)  On finasteride.  No complaints of dysuria.    Major depressive disorder; Cognitive impairment  On sertraline.    Progressive supranuclear palsy:  Has seen  neurology in the past.    Impaired gait and mobility; generalized muscle weakness:  Patient is at [facility] for skilled services.  Had falls at home.  No falls reported since he admitted.                  Reviewed [facility] records from recent hospitalization.  Reviewed  EMR  Reviewed medical, social, surgical and family history.  Reviewed all current medications and performed medication reconciliation.  Performed prescription drug management.  Reviewed vital signs AND lab results  Reviewed [facility] Documentation  Discussed patient with nursing and in house NP.  Spent greater than 50 minutes on patient visit.                 ROS: 10 point ROS performed; Negative unless noted in HPI.    MEDICAL HISTORY:  BPH  HTN  spinal stenosis  cognitive impairment  progressive supranuclear palsy.    SURGICAL HISTORY:  No surgical history.    SOCIAL HISTORY:  Former smoker.  No alcohol use.  No illicit  "drug use    FAMILY HISTORY:  Not available.    ALLERGIES:  Latex  Talwin compound  Nsaids (non-steroidal anti-inflammatory drug)  Pentazocine       LABS:  BMP: Date: 1/7/2025 Na 145 K 3.5 Cr 1.07 BUN 44 glucose 91 Ca 9.2 GFR 75  CBC: Date: 1/7/2025 WBC 14.73 Hgb 16.7 hematocrit 50.1 platelets 234  Liver function: Date: 1/7/2025 ALT 90  alkaline phos.  126 bilirubin 0.8 albumin 3.8 protein 7.1    BMP: Date: 1/9/2025 Na 149 K 4.7 Cr 0.92 BUN 34 glucose 75 Ca 9.2 GFR 90  CBC: Date: 1/9/2025 WBC 16.57 Hgb 16.8 hematocrit 50.6 platelets 302  Liver function: Date: 1/9/2025 ALT 63 AST (Not available)  alkaline phos.  110 bilirubin 0.7 albumin 3.4 protein 7.2        /64   Pulse 77   Temp 36.2 °C (97.1 °F)   Resp 16   Ht 1.753 m (5' 9\")   Wt 81.6 kg (180 lb)   SpO2 97%   BMI 26.58 kg/m²      Physical Exam  Vitals and nursing note reviewed.   Constitutional:       General: He is awake.      Appearance: He is ill-appearing.   HENT:      Head: Normocephalic.      Right Ear: External ear normal.      Left Ear: External ear normal.      Nose: Nose normal.      Mouth/Throat:      Mouth: Mucous membranes are moist.      Pharynx: Oropharynx is clear.   Eyes:      Extraocular Movements: Extraocular movements intact.      Conjunctiva/sclera: Conjunctivae normal.      Pupils: Pupils are equal, round, and reactive to light.   Cardiovascular:      Rate and Rhythm: Normal rate and regular rhythm.      Pulses: Normal pulses.      Heart sounds: Normal heart sounds.   Pulmonary:      Effort: Pulmonary effort is normal.      Breath sounds: Normal breath sounds.   Abdominal:      General: Bowel sounds are normal.      Palpations: Abdomen is soft.   Musculoskeletal:         General: Normal range of motion.      Cervical back: Normal range of motion and neck supple.   Skin:     General: Skin is warm and dry.   Neurological:      General: No focal deficit present.      Mental Status: Mental status is at baseline. He is " confused.      Sensory: Sensation is intact.      Motor: Weakness present.      Coordination: Coordination abnormal.      Gait: Gait abnormal.      Comments: A x O x 1 pleasantly confused   Psychiatric:         Mood and Affect: Affect is inappropriate.         Behavior: Behavior is cooperative.         Cognition and Memory: Cognition is impaired.         Judgment: Judgment is inappropriate.          Assessment/Plan   CBC, CMP ordered Q Monday.    Leukocytosis:  Monitor WBC  Continue Rocephin.  UA C & S ordered, results pending-follow up when available.    Hypernatremia:  Monitor sodium  Continue oral fluids 300 ml q shift ordered.  Staff to encourage PO fluids.  If not improved will need IVF.    Hypokalemia:  Improved.  Treated with potassium chloride  Monitor potassium level.    Elevated liver enzymes:  Monitor liver enzymes.  Follow up on liver ultrasound when available.    Frequent Falls:  Evaluated and treated in the hospital.  Suspected mechanical due to chronic pain and debility.  CT head/cervical spine/lumbar spine performed showed chronic degenerative changes.   Continue PT/OT at [facility].  Fall prevention strategies.    Primary hypertension:  Continue amlodipine, metoprolol and ramipril.  Monitor Bps.    Other chronic pain:  Continue gabapentin and percocet PRN.    BPH (benign prostatic hyperplasia):  Continue finasteride.  Monitor for dysuria.    Major depressive disorder; Cognitive impairment  Continue sertraline.    Progressive supranuclear palsy:  Has seen  neurology in the past.  Follow up with  neurology.    Impaired gait and mobility; generalized muscle weakness:  Patient is at [facility] for skilled services.  Fall prevention strategies.       Problem List Items Addressed This Visit       Other chronic pain    Cognitive impairment    Recurrent major depressive disorder (CMS-HCC)    Hypokalemia    Frequent falls    BPH (benign prostatic hyperplasia)    Hypernatremia    Leukocytosis -  Primary     Other Visit Diagnoses       Elevated liver enzymes        Benign essential HTN        Progressive supranuclear palsies (Multi)        Impaired gait and mobility        Generalized muscle weakness                FLACO Arnold       Electronically Signed By: FLACO Arnold   1/12/25  8:49 PM

## 2025-01-11 ENCOUNTER — HOSPITAL ENCOUNTER (INPATIENT)
Facility: HOSPITAL | Age: 70
End: 2025-01-11
Attending: EMERGENCY MEDICINE | Admitting: STUDENT IN AN ORGANIZED HEALTH CARE EDUCATION/TRAINING PROGRAM
Payer: MEDICARE

## 2025-01-11 ENCOUNTER — APPOINTMENT (OUTPATIENT)
Dept: CARDIOLOGY | Facility: HOSPITAL | Age: 70
DRG: 871 | End: 2025-01-11
Payer: OTHER MISCELLANEOUS

## 2025-01-11 ENCOUNTER — APPOINTMENT (OUTPATIENT)
Dept: RADIOLOGY | Facility: HOSPITAL | Age: 70
DRG: 871 | End: 2025-01-11
Payer: OTHER MISCELLANEOUS

## 2025-01-11 DIAGNOSIS — A41.9 SEPSIS, DUE TO UNSPECIFIED ORGANISM, UNSPECIFIED WHETHER ACUTE ORGAN DYSFUNCTION PRESENT (MULTI): ICD-10-CM

## 2025-01-11 DIAGNOSIS — R50.9 FEBRILE ILLNESS: ICD-10-CM

## 2025-01-11 DIAGNOSIS — E87.8 ELECTROLYTE IMBALANCE: ICD-10-CM

## 2025-01-11 DIAGNOSIS — J96.21 ACUTE ON CHRONIC RESPIRATORY FAILURE WITH HYPOXIA (MULTI): Primary | ICD-10-CM

## 2025-01-11 PROBLEM — R39.89 SUSPECTED UTI: Status: ACTIVE | Noted: 2025-01-11

## 2025-01-11 PROBLEM — E87.0 HYPERNATREMIA: Status: ACTIVE | Noted: 2025-01-11

## 2025-01-11 PROBLEM — D72.829 LEUKOCYTOSIS: Status: ACTIVE | Noted: 2025-01-11

## 2025-01-11 PROBLEM — R79.89 ELEVATED TROPONIN: Status: ACTIVE | Noted: 2025-01-11

## 2025-01-11 PROBLEM — E80.6 HYPERBILIRUBINEMIA: Status: ACTIVE | Noted: 2025-01-11

## 2025-01-11 PROBLEM — G93.41 ACUTE METABOLIC ENCEPHALOPATHY: Status: ACTIVE | Noted: 2025-01-11

## 2025-01-11 PROBLEM — R06.82 TACHYPNEA: Status: ACTIVE | Noted: 2025-01-11

## 2025-01-11 PROBLEM — D75.839 THROMBOCYTOSIS: Status: ACTIVE | Noted: 2025-01-11

## 2025-01-11 LAB
ALBUMIN SERPL BCP-MCNC: 4 G/DL (ref 3.4–5)
ALP SERPL-CCNC: 71 U/L (ref 33–136)
ALT SERPL W P-5'-P-CCNC: 51 U/L (ref 10–52)
ANION GAP SERPL CALCULATED.3IONS-SCNC: 22 MMOL/L (ref 10–20)
APPEARANCE UR: ABNORMAL
AST SERPL W P-5'-P-CCNC: 34 U/L (ref 9–39)
BACTERIA #/AREA URNS AUTO: ABNORMAL /HPF
BASOPHILS # BLD AUTO: 0.11 X10*3/UL (ref 0–0.1)
BASOPHILS NFR BLD AUTO: 0.4 %
BILIRUB SERPL-MCNC: 1.3 MG/DL (ref 0–1.2)
BILIRUB UR STRIP.AUTO-MCNC: NEGATIVE MG/DL
BNP SERPL-MCNC: 92 PG/ML (ref 0–99)
BUN SERPL-MCNC: 73 MG/DL (ref 6–23)
CALCIUM SERPL-MCNC: 9.1 MG/DL (ref 8.6–10.3)
CARDIAC TROPONIN I PNL SERPL HS: 455 NG/L (ref 0–20)
CHLORIDE SERPL-SCNC: 124 MMOL/L (ref 98–107)
CO2 SERPL-SCNC: 22 MMOL/L (ref 21–32)
COLOR UR: YELLOW
CREAT SERPL-MCNC: 3.73 MG/DL (ref 0.5–1.3)
EGFRCR SERPLBLD CKD-EPI 2021: 17 ML/MIN/1.73M*2
EOSINOPHIL # BLD AUTO: 0.01 X10*3/UL (ref 0–0.7)
EOSINOPHIL NFR BLD AUTO: 0 %
ERYTHROCYTE [DISTWIDTH] IN BLOOD BY AUTOMATED COUNT: 15.7 % (ref 11.5–14.5)
FLUAV RNA RESP QL NAA+PROBE: NOT DETECTED
FLUBV RNA RESP QL NAA+PROBE: NOT DETECTED
GLUCOSE SERPL-MCNC: 157 MG/DL (ref 74–99)
GLUCOSE UR STRIP.AUTO-MCNC: NORMAL MG/DL
HCT VFR BLD AUTO: 59.6 % (ref 41–52)
HGB BLD-MCNC: 19.2 G/DL (ref 13.5–17.5)
HYALINE CASTS #/AREA URNS AUTO: ABNORMAL /LPF
IMM GRANULOCYTES # BLD AUTO: 0.16 X10*3/UL (ref 0–0.7)
IMM GRANULOCYTES NFR BLD AUTO: 0.6 % (ref 0–0.9)
KETONES UR STRIP.AUTO-MCNC: NEGATIVE MG/DL
LACTATE SERPL-SCNC: 4.8 MMOL/L (ref 0.4–2)
LEUKOCYTE ESTERASE UR QL STRIP.AUTO: ABNORMAL
LIPASE SERPL-CCNC: 54 U/L (ref 9–82)
LYMPHOCYTES # BLD AUTO: 1.53 X10*3/UL (ref 1.2–4.8)
LYMPHOCYTES NFR BLD AUTO: 6 %
MCH RBC QN AUTO: 29.5 PG (ref 26–34)
MCHC RBC AUTO-ENTMCNC: 32.2 G/DL (ref 32–36)
MCV RBC AUTO: 92 FL (ref 80–100)
MONOCYTES # BLD AUTO: 2.03 X10*3/UL (ref 0.1–1)
MONOCYTES NFR BLD AUTO: 8 %
MUCOUS THREADS #/AREA URNS AUTO: ABNORMAL /LPF
NEUTROPHILS # BLD AUTO: 21.54 X10*3/UL (ref 1.2–7.7)
NEUTROPHILS NFR BLD AUTO: 85 %
NITRITE UR QL STRIP.AUTO: NEGATIVE
NRBC BLD-RTO: 0 /100 WBCS (ref 0–0)
PH UR STRIP.AUTO: 5 [PH]
PLATELET # BLD AUTO: 464 X10*3/UL (ref 150–450)
POTASSIUM SERPL-SCNC: 4.7 MMOL/L (ref 3.5–5.3)
PROT SERPL-MCNC: 8.1 G/DL (ref 6.4–8.2)
PROT UR STRIP.AUTO-MCNC: ABNORMAL MG/DL
RBC # BLD AUTO: 6.5 X10*6/UL (ref 4.5–5.9)
RBC # UR STRIP.AUTO: ABNORMAL /UL
RBC #/AREA URNS AUTO: ABNORMAL /HPF
RSV RNA RESP QL NAA+PROBE: NOT DETECTED
SARS-COV-2 RNA RESP QL NAA+PROBE: NOT DETECTED
SODIUM SERPL-SCNC: 163 MMOL/L (ref 136–145)
SP GR UR STRIP.AUTO: 1.02
UROBILINOGEN UR STRIP.AUTO-MCNC: ABNORMAL MG/DL
WBC # BLD AUTO: 25.4 X10*3/UL (ref 4.4–11.3)
WBC #/AREA URNS AUTO: >50 /HPF
WBC CLUMPS #/AREA URNS AUTO: ABNORMAL /HPF

## 2025-01-11 PROCEDURE — 83690 ASSAY OF LIPASE: CPT | Performed by: EMERGENCY MEDICINE

## 2025-01-11 PROCEDURE — 85025 COMPLETE CBC W/AUTO DIFF WBC: CPT | Performed by: EMERGENCY MEDICINE

## 2025-01-11 PROCEDURE — 96360 HYDRATION IV INFUSION INIT: CPT | Mod: 59

## 2025-01-11 PROCEDURE — 87075 CULTR BACTERIA EXCEPT BLOOD: CPT | Mod: WESLAB | Performed by: EMERGENCY MEDICINE

## 2025-01-11 PROCEDURE — 36415 COLL VENOUS BLD VENIPUNCTURE: CPT | Performed by: EMERGENCY MEDICINE

## 2025-01-11 PROCEDURE — 99291 CRITICAL CARE FIRST HOUR: CPT | Mod: 25 | Performed by: EMERGENCY MEDICINE

## 2025-01-11 PROCEDURE — 2500000005 HC RX 250 GENERAL PHARMACY W/O HCPCS: Performed by: STUDENT IN AN ORGANIZED HEALTH CARE EDUCATION/TRAINING PROGRAM

## 2025-01-11 PROCEDURE — 87086 URINE CULTURE/COLONY COUNT: CPT | Mod: WESLAB | Performed by: EMERGENCY MEDICINE

## 2025-01-11 PROCEDURE — 87040 BLOOD CULTURE FOR BACTERIA: CPT | Mod: WESLAB | Performed by: EMERGENCY MEDICINE

## 2025-01-11 PROCEDURE — 93005 ELECTROCARDIOGRAM TRACING: CPT

## 2025-01-11 PROCEDURE — 2500000005 HC RX 250 GENERAL PHARMACY W/O HCPCS: Performed by: EMERGENCY MEDICINE

## 2025-01-11 PROCEDURE — 99223 1ST HOSP IP/OBS HIGH 75: CPT | Performed by: STUDENT IN AN ORGANIZED HEALTH CARE EDUCATION/TRAINING PROGRAM

## 2025-01-11 PROCEDURE — 31720 CLEARANCE OF AIRWAYS: CPT

## 2025-01-11 PROCEDURE — 80053 COMPREHEN METABOLIC PANEL: CPT | Performed by: EMERGENCY MEDICINE

## 2025-01-11 PROCEDURE — 71045 X-RAY EXAM CHEST 1 VIEW: CPT | Performed by: RADIOLOGY

## 2025-01-11 PROCEDURE — 83605 ASSAY OF LACTIC ACID: CPT | Performed by: EMERGENCY MEDICINE

## 2025-01-11 PROCEDURE — 93010 ELECTROCARDIOGRAM REPORT: CPT | Performed by: INTERNAL MEDICINE

## 2025-01-11 PROCEDURE — 1210000001 HC SEMI-PRIVATE ROOM DAILY

## 2025-01-11 PROCEDURE — 87637 SARSCOV2&INF A&B&RSV AMP PRB: CPT | Performed by: EMERGENCY MEDICINE

## 2025-01-11 PROCEDURE — 84484 ASSAY OF TROPONIN QUANT: CPT | Performed by: EMERGENCY MEDICINE

## 2025-01-11 PROCEDURE — 81001 URINALYSIS AUTO W/SCOPE: CPT | Performed by: EMERGENCY MEDICINE

## 2025-01-11 PROCEDURE — 2500000004 HC RX 250 GENERAL PHARMACY W/ HCPCS (ALT 636 FOR OP/ED): Performed by: EMERGENCY MEDICINE

## 2025-01-11 PROCEDURE — 71045 X-RAY EXAM CHEST 1 VIEW: CPT

## 2025-01-11 PROCEDURE — 2500000004 HC RX 250 GENERAL PHARMACY W/ HCPCS (ALT 636 FOR OP/ED): Performed by: STUDENT IN AN ORGANIZED HEALTH CARE EDUCATION/TRAINING PROGRAM

## 2025-01-11 PROCEDURE — 5A0935A ASSISTANCE WITH RESPIRATORY VENTILATION, LESS THAN 24 CONSECUTIVE HOURS, HIGH NASAL FLOW/VELOCITY: ICD-10-PCS | Performed by: EMERGENCY MEDICINE

## 2025-01-11 PROCEDURE — 51798 US URINE CAPACITY MEASURE: CPT

## 2025-01-11 PROCEDURE — 83880 ASSAY OF NATRIURETIC PEPTIDE: CPT | Performed by: EMERGENCY MEDICINE

## 2025-01-11 PROCEDURE — 2500000001 HC RX 250 WO HCPCS SELF ADMINISTERED DRUGS (ALT 637 FOR MEDICARE OP)

## 2025-01-11 RX ORDER — KETOROLAC TROMETHAMINE 15 MG/ML
15 INJECTION, SOLUTION INTRAMUSCULAR; INTRAVENOUS EVERY 6 HOURS PRN
Status: DISCONTINUED | OUTPATIENT
Start: 2025-01-11 | End: 2025-01-12

## 2025-01-11 RX ORDER — MORPHINE SULFATE 4 MG/ML
4 INJECTION, SOLUTION INTRAMUSCULAR; INTRAVENOUS
Status: DISCONTINUED | OUTPATIENT
Start: 2025-01-11 | End: 2025-01-12

## 2025-01-11 RX ORDER — MORPHINE SULFATE 2 MG/ML
2 INJECTION, SOLUTION INTRAMUSCULAR; INTRAVENOUS
Status: DISCONTINUED | OUTPATIENT
Start: 2025-01-11 | End: 2025-01-12

## 2025-01-11 RX ORDER — MORPHINE SULFATE 2 MG/ML
2 INJECTION, SOLUTION INTRAMUSCULAR; INTRAVENOUS EVERY 4 HOURS
Status: DISCONTINUED | OUTPATIENT
Start: 2025-01-11 | End: 2025-01-12

## 2025-01-11 RX ORDER — ACETAMINOPHEN 650 MG/1
650 SUPPOSITORY RECTAL EVERY 4 HOURS PRN
Status: DISPENSED | OUTPATIENT
Start: 2025-01-11

## 2025-01-11 RX ORDER — ALBUTEROL SULFATE 0.83 MG/ML
2.5 SOLUTION RESPIRATORY (INHALATION) EVERY 4 HOURS PRN
Status: ACTIVE | OUTPATIENT
Start: 2025-01-11

## 2025-01-11 RX ORDER — HALOPERIDOL 2 MG/ML
1 SOLUTION ORAL EVERY 4 HOURS PRN
Status: DISPENSED | OUTPATIENT
Start: 2025-01-11

## 2025-01-11 RX ORDER — ACETAMINOPHEN 650 MG/1
650 SUPPOSITORY RECTAL ONCE
Status: COMPLETED | OUTPATIENT
Start: 2025-01-11 | End: 2025-01-11

## 2025-01-11 RX ORDER — VANCOMYCIN 1 G/200ML
1000 INJECTION, SOLUTION INTRAVENOUS ONCE
Status: DISCONTINUED | OUTPATIENT
Start: 2025-01-11 | End: 2025-01-11

## 2025-01-11 RX ADMIN — Medication 2 L/MIN: at 22:22

## 2025-01-11 RX ADMIN — Medication 40 L/MIN: at 19:29

## 2025-01-11 RX ADMIN — SODIUM CHLORIDE 1890 ML: 900 INJECTION, SOLUTION INTRAVENOUS at 19:12

## 2025-01-11 RX ADMIN — Medication 2 L/MIN: at 22:23

## 2025-01-11 RX ADMIN — MORPHINE SULFATE 2 MG: 2 INJECTION, SOLUTION INTRAMUSCULAR; INTRAVENOUS at 21:56

## 2025-01-11 RX ADMIN — KETOROLAC TROMETHAMINE 15 MG: 15 INJECTION, SOLUTION INTRAMUSCULAR; INTRAVENOUS at 21:56

## 2025-01-11 RX ADMIN — ACETAMINOPHEN 650 MG: 650 SUPPOSITORY RECTAL at 19:17

## 2025-01-11 ASSESSMENT — PAIN SCALES - PAIN ASSESSMENT IN ADVANCED DEMENTIA (PAINAD)
FACIALEXPRESSION: FACIAL GRIMACING
TOTALSCORE: 4
BODYLANGUAGE: RELAXED
FACIALEXPRESSION: FACIAL GRIMACING
CONSOLABILITY: NO NEED TO CONSOLE
BODYLANGUAGE: TENSE, DISTRESSED PACING, FIDGETING
CONSOLABILITY: NO NEED TO CONSOLE
BREATHING: NOISY LABORED BREATHING, LONG PERIODS OF HYPERVENTILATION, CHEYNE-STOKES RESPIRATIONS
BREATHING: NOISY LABORED BREATHING, LONG PERIODS OF HYPERVENTILATION, CHEYNE-STOKES RESPIRATIONS
TOTALSCORE: 5

## 2025-01-11 ASSESSMENT — PAIN - FUNCTIONAL ASSESSMENT
PAIN_FUNCTIONAL_ASSESSMENT: PAINAD (PAIN ASSESSMENT IN ADVANCED DEMENTIA SCALE)
PAIN_FUNCTIONAL_ASSESSMENT: PAINAD (PAIN ASSESSMENT IN ADVANCED DEMENTIA SCALE)

## 2025-01-11 ASSESSMENT — COLUMBIA-SUICIDE SEVERITY RATING SCALE - C-SSRS
6. HAVE YOU EVER DONE ANYTHING, STARTED TO DO ANYTHING, OR PREPARED TO DO ANYTHING TO END YOUR LIFE?: NO
2. HAVE YOU ACTUALLY HAD ANY THOUGHTS OF KILLING YOURSELF?: NO
1. IN THE PAST MONTH, HAVE YOU WISHED YOU WERE DEAD OR WISHED YOU COULD GO TO SLEEP AND NOT WAKE UP?: NO

## 2025-01-12 VITALS
RESPIRATION RATE: 16 BRPM | TEMPERATURE: 97.1 F | HEIGHT: 69 IN | WEIGHT: 180 LBS | BODY MASS INDEX: 26.66 KG/M2 | OXYGEN SATURATION: 97 % | SYSTOLIC BLOOD PRESSURE: 121 MMHG | HEART RATE: 77 BPM | DIASTOLIC BLOOD PRESSURE: 64 MMHG

## 2025-01-12 VITALS
SYSTOLIC BLOOD PRESSURE: 76 MMHG | HEART RATE: 68 BPM | BODY MASS INDEX: 18.81 KG/M2 | OXYGEN SATURATION: 92 % | DIASTOLIC BLOOD PRESSURE: 53 MMHG | WEIGHT: 138.89 LBS | TEMPERATURE: 98.1 F | HEIGHT: 72 IN | RESPIRATION RATE: 10 BRPM

## 2025-01-12 LAB
BACTERIA BLD CULT: NORMAL
BACTERIA BLD CULT: NORMAL
HOLD SPECIMEN: NORMAL

## 2025-01-12 PROCEDURE — 2500000004 HC RX 250 GENERAL PHARMACY W/ HCPCS (ALT 636 FOR OP/ED): Performed by: NURSE PRACTITIONER

## 2025-01-12 PROCEDURE — 2500000004 HC RX 250 GENERAL PHARMACY W/ HCPCS (ALT 636 FOR OP/ED): Performed by: STUDENT IN AN ORGANIZED HEALTH CARE EDUCATION/TRAINING PROGRAM

## 2025-01-12 PROCEDURE — 99233 SBSQ HOSP IP/OBS HIGH 50: CPT | Performed by: NURSE PRACTITIONER

## 2025-01-12 PROCEDURE — 2500000005 HC RX 250 GENERAL PHARMACY W/O HCPCS: Performed by: NURSE PRACTITIONER

## 2025-01-12 PROCEDURE — 99223 1ST HOSP IP/OBS HIGH 75: CPT | Performed by: NURSE PRACTITIONER

## 2025-01-12 PROCEDURE — 2500000005 HC RX 250 GENERAL PHARMACY W/O HCPCS: Performed by: EMERGENCY MEDICINE

## 2025-01-12 PROCEDURE — 1210000001 HC SEMI-PRIVATE ROOM DAILY

## 2025-01-12 PROCEDURE — 2500000001 HC RX 250 WO HCPCS SELF ADMINISTERED DRUGS (ALT 637 FOR MEDICARE OP): Performed by: STUDENT IN AN ORGANIZED HEALTH CARE EDUCATION/TRAINING PROGRAM

## 2025-01-12 PROCEDURE — 2500000005 HC RX 250 GENERAL PHARMACY W/O HCPCS: Performed by: STUDENT IN AN ORGANIZED HEALTH CARE EDUCATION/TRAINING PROGRAM

## 2025-01-12 RX ORDER — MORPHINE SULFATE/0.9% NACL/PF 1 MG/ML
.5-1 PLASTIC BAG, INJECTION (ML) INTRAVENOUS CONTINUOUS
Status: DISPENSED | OUTPATIENT
Start: 2025-01-12

## 2025-01-12 RX ORDER — MORPHINE SULFATE 2 MG/ML
2 INJECTION, SOLUTION INTRAMUSCULAR; INTRAVENOUS ONCE
Status: COMPLETED | OUTPATIENT
Start: 2025-01-12 | End: 2025-01-12

## 2025-01-12 RX ORDER — HYOSCYAMINE SULFATE 0.12 MG/1
0.12 TABLET, ORALLY DISINTEGRATING ORAL EVERY 4 HOURS PRN
Status: DISPENSED | OUTPATIENT
Start: 2025-01-12

## 2025-01-12 RX ADMIN — MORPHINE SULFATE 2 MG: 2 INJECTION, SOLUTION INTRAMUSCULAR; INTRAVENOUS at 01:58

## 2025-01-12 RX ADMIN — Medication 2 L/MIN: at 21:43

## 2025-01-12 RX ADMIN — MORPHINE SULFATE 2 MG: 2 INJECTION, SOLUTION INTRAMUSCULAR; INTRAVENOUS at 06:29

## 2025-01-12 RX ADMIN — Medication 3 L/MIN: at 09:13

## 2025-01-12 RX ADMIN — MORPHINE SULFATE 2 MG: 2 INJECTION, SOLUTION INTRAMUSCULAR; INTRAVENOUS at 01:11

## 2025-01-12 RX ADMIN — KETOROLAC TROMETHAMINE 15 MG: 15 INJECTION, SOLUTION INTRAMUSCULAR; INTRAVENOUS at 04:27

## 2025-01-12 RX ADMIN — ACETAMINOPHEN 650 MG: 650 SUPPOSITORY RECTAL at 02:06

## 2025-01-12 RX ADMIN — MORPHINE SULFATE 2 MG: 2 INJECTION, SOLUTION INTRAMUSCULAR; INTRAVENOUS at 09:47

## 2025-01-12 RX ADMIN — Medication 2 MG/HR: at 09:49

## 2025-01-12 RX ADMIN — HYOSCYAMINE SULFATE 0.12 MG: 0.12 TABLET, ORALLY DISINTEGRATING ORAL at 02:17

## 2025-01-12 RX ADMIN — Medication 6 L/MIN: at 21:42

## 2025-01-12 RX ADMIN — MORPHINE SULFATE 2 MG: 2 INJECTION, SOLUTION INTRAMUSCULAR; INTRAVENOUS at 00:26

## 2025-01-12 SDOH — SOCIAL STABILITY: SOCIAL INSECURITY: HAS ANYONE EVER THREATENED TO HURT YOUR FAMILY OR YOUR PETS?: NO

## 2025-01-12 SDOH — SOCIAL STABILITY: SOCIAL INSECURITY: DO YOU FEEL ANYONE HAS EXPLOITED OR TAKEN ADVANTAGE OF YOU FINANCIALLY OR OF YOUR PERSONAL PROPERTY?: NO

## 2025-01-12 SDOH — SOCIAL STABILITY: SOCIAL INSECURITY: ARE YOU OR HAVE YOU BEEN THREATENED OR ABUSED PHYSICALLY, EMOTIONALLY, OR SEXUALLY BY ANYONE?: NO

## 2025-01-12 SDOH — SOCIAL STABILITY: SOCIAL INSECURITY: ABUSE: ADULT

## 2025-01-12 SDOH — SOCIAL STABILITY: SOCIAL INSECURITY: DOES ANYONE TRY TO KEEP YOU FROM HAVING/CONTACTING OTHER FRIENDS OR DOING THINGS OUTSIDE YOUR HOME?: NO

## 2025-01-12 SDOH — SOCIAL STABILITY: SOCIAL INSECURITY: HAVE YOU HAD ANY THOUGHTS OF HARMING ANYONE ELSE?: NO

## 2025-01-12 SDOH — SOCIAL STABILITY: SOCIAL INSECURITY: ARE THERE ANY APPARENT SIGNS OF INJURIES/BEHAVIORS THAT COULD BE RELATED TO ABUSE/NEGLECT?: NO

## 2025-01-12 SDOH — SOCIAL STABILITY: SOCIAL INSECURITY: HAVE YOU HAD THOUGHTS OF HARMING ANYONE ELSE?: NO

## 2025-01-12 SDOH — SOCIAL STABILITY: SOCIAL INSECURITY: DO YOU FEEL UNSAFE GOING BACK TO THE PLACE WHERE YOU ARE LIVING?: NO

## 2025-01-12 SDOH — SOCIAL STABILITY: SOCIAL INSECURITY: WERE YOU ABLE TO COMPLETE ALL THE BEHAVIORAL HEALTH SCREENINGS?: NO

## 2025-01-12 ASSESSMENT — COGNITIVE AND FUNCTIONAL STATUS - GENERAL
DAILY ACTIVITIY SCORE: 6
PATIENT BASELINE BEDBOUND: NO
MOBILITY SCORE: 6
STANDING UP FROM CHAIR USING ARMS: TOTAL
DAILY ACTIVITIY SCORE: 6
HELP NEEDED FOR BATHING: TOTAL
MOBILITY SCORE: 6
PERSONAL GROOMING: TOTAL
HELP NEEDED FOR BATHING: TOTAL
MOVING FROM LYING ON BACK TO SITTING ON SIDE OF FLAT BED WITH BEDRAILS: TOTAL
CLIMB 3 TO 5 STEPS WITH RAILING: TOTAL
MOVING TO AND FROM BED TO CHAIR: TOTAL
DRESSING REGULAR LOWER BODY CLOTHING: TOTAL
MOVING FROM LYING ON BACK TO SITTING ON SIDE OF FLAT BED WITH BEDRAILS: TOTAL
MOVING TO AND FROM BED TO CHAIR: TOTAL
DRESSING REGULAR UPPER BODY CLOTHING: TOTAL
DRESSING REGULAR LOWER BODY CLOTHING: TOTAL
EATING MEALS: TOTAL
STANDING UP FROM CHAIR USING ARMS: TOTAL
CLIMB 3 TO 5 STEPS WITH RAILING: TOTAL
TURNING FROM BACK TO SIDE WHILE IN FLAT BAD: TOTAL
DRESSING REGULAR UPPER BODY CLOTHING: TOTAL
TOILETING: TOTAL
PERSONAL GROOMING: TOTAL
WALKING IN HOSPITAL ROOM: TOTAL
WALKING IN HOSPITAL ROOM: TOTAL
TURNING FROM BACK TO SIDE WHILE IN FLAT BAD: TOTAL
EATING MEALS: TOTAL
TOILETING: TOTAL

## 2025-01-12 ASSESSMENT — RESPIRATORY DISTRESS OBSERVATION SCALE (RDOS)
RDOS TOTAL SCORE: 3
HEART RATE PER MINUTE: 1 - 90-109 BEATS
ACCESSORY MUSCLE RISE IN CLAVICLE DURING INSPIRATION: 1 - SLIGHT RISE
RESPIRATORY RATE PER MINUTE: 1 - 19-30 BREATHS
GRUNTING AT END OF EXPIRATION: 0 - NONE
PARADOXICAL BREATHING PATTERN: 0 - NONE
RESPIRATORY RATE PER MINUTE: 1 - 19-30 BREATHS
PARADOXICAL BREATHING PATTERN: 0 - NONE
RESPIRATORY RATE PER MINUTE: 1 - 19-30 BREATHS
HEART RATE PER MINUTE: 0 - <90 BEATS
GRUNTING AT END OF EXPIRATION: 0 - NONE
RESTLESS NONPURPOSEFUL MOVEMENTS: 0 - NONE
LOOK OF FEAR: 0 - NONE
ACCESSORY MUSCLE RISE IN CLAVICLE DURING INSPIRATION: 0 - NONE
LOOK OF FEAR: 0 - NONE
INVOLUNTARY NASAL FLARING: 0 - NONE
HEART RATE PER MINUTE: 1 - 90-109 BEATS
PARADOXICAL BREATHING PATTERN: 0 - NONE
RDOS TOTAL SCORE: 3
LOOK OF FEAR: 0 - NONE
INVOLUNTARY NASAL FLARING: 0 - NONE
GRUNTING AT END OF EXPIRATION: 0 - NONE
INVOLUNTARY NASAL FLARING: 0 - NONE
RESTLESS NONPURPOSEFUL MOVEMENTS: 1 - OCCASIONAL, SLIGHT MOVEMENTS
RDOS TOTAL SCORE: 3
PARADOXICAL BREATHING PATTERN: 0 - NONE
RESPIRATORY RATE PER MINUTE: 0 - <19 BREATHS
GRUNTING AT END OF EXPIRATION: 0 - NONE
ACCESSORY MUSCLE RISE IN CLAVICLE DURING INSPIRATION: 1 - SLIGHT RISE
LOOK OF FEAR: 0 - NONE
RESTLESS NONPURPOSEFUL MOVEMENTS: 0 - NONE
RESTLESS NONPURPOSEFUL MOVEMENTS: 0 - NONE
HEART RATE PER MINUTE: 0 - <90 BEATS
RDOS TOTAL SCORE: 0
INVOLUNTARY NASAL FLARING: 0 - NONE
ACCESSORY MUSCLE RISE IN CLAVICLE DURING INSPIRATION: 1 - SLIGHT RISE

## 2025-01-12 ASSESSMENT — PAIN SCALES - PAIN ASSESSMENT IN ADVANCED DEMENTIA (PAINAD)
BREATHING: NORMAL
BREATHING: NORMAL
BODYLANGUAGE: RELAXED
FACIALEXPRESSION: SMILING OR INEXPRESSIVE
TOTALSCORE: 0
TOTALSCORE: 0
FACIALEXPRESSION: SMILING OR INEXPRESSIVE
CONSOLABILITY: NO NEED TO CONSOLE
CONSOLABILITY: NO NEED TO CONSOLE
BODYLANGUAGE: RELAXED

## 2025-01-12 ASSESSMENT — ACTIVITIES OF DAILY LIVING (ADL)
DRESSING YOURSELF: DEPENDENT
TOILETING: DEPENDENT
GROOMING: DEPENDENT
ADEQUATE_TO_COMPLETE_ADL: NO
HEARING - RIGHT EAR: FUNCTIONAL
FEEDING YOURSELF: DEPENDENT
PATIENT'S MEMORY ADEQUATE TO SAFELY COMPLETE DAILY ACTIVITIES?: NO
WALKS IN HOME: DEPENDENT
HEARING - LEFT EAR: FUNCTIONAL
BATHING: DEPENDENT
JUDGMENT_ADEQUATE_SAFELY_COMPLETE_DAILY_ACTIVITIES: NO

## 2025-01-12 ASSESSMENT — LIFESTYLE VARIABLES
HOW MANY STANDARD DRINKS CONTAINING ALCOHOL DO YOU HAVE ON A TYPICAL DAY: PATIENT DOES NOT DRINK
HOW OFTEN DO YOU HAVE 6 OR MORE DRINKS ON ONE OCCASION: NEVER
HOW OFTEN DO YOU HAVE A DRINK CONTAINING ALCOHOL: NEVER
AUDIT-C TOTAL SCORE: 0
SKIP TO QUESTIONS 9-10: 1
PRESCIPTION_ABUSE_PAST_12_MONTHS: NO
SUBSTANCE_ABUSE_PAST_12_MONTHS: NO
AUDIT-C TOTAL SCORE: 0

## 2025-01-12 ASSESSMENT — PAIN SCALES - GENERAL: PAINLEVEL_OUTOF10: 0 - NO PAIN

## 2025-01-12 ASSESSMENT — PATIENT HEALTH QUESTIONNAIRE - PHQ9
SUM OF ALL RESPONSES TO PHQ9 QUESTIONS 1 & 2: 0
2. FEELING DOWN, DEPRESSED OR HOPELESS: NOT AT ALL
1. LITTLE INTEREST OR PLEASURE IN DOING THINGS: NOT AT ALL

## 2025-01-12 ASSESSMENT — PAIN - FUNCTIONAL ASSESSMENT: PAIN_FUNCTIONAL_ASSESSMENT: PAINAD (PAIN ASSESSMENT IN ADVANCED DEMENTIA SCALE)

## 2025-01-12 NOTE — NURSING NOTE
Patient is resting in bed with family at bedside, wife reports that patient is comfortable at this time, does not want further intervention. Patients morphine running at 2mg/hr

## 2025-01-12 NOTE — ASSESSMENT & PLAN NOTE
Initially placed on 40 L 100% oxygen, comfort care orders placed wean down to 2 L nasal cannula.  Comfort care orders with IV pain medications

## 2025-01-12 NOTE — PROGRESS NOTES
Attestation/Supervisory note for TEJA Guzman      The patient is a 69-year-old male presenting to the emergency department by EMS from the [facility] where he resides.  EMS reports that the patient reportedly has been having some respiratory distress.  He reportedly is at his baseline mental status but they are not clear exactly what that is.  The Staff the [facility] reportedly could not completely describe what his baseline mental status is but he is known to have cognitive decline/dementia.  The patient is not able to provide any details regarding HPI or review of systems.  The patient reportedly did have an x-ray performed either early this morning or yesterday that did not show any evidence of pneumonia.  EMS reports that the patient has been diaphoretic and is nonresponsive to verbal or painful stimuli.  The patient reportedly had outpatient blood work done today and it showed a leukocytosis of 23,000.  EMS reports that the patient's CODE STATUS is DNR DNI CC arrest that he is not to be intubated under any conditions.  Vital signs with diastolic hypertension, tachycardia, tachypnea and hypoxia.  Sickle exam with a well-nourished well-developed male with disheveled appearance and evidence of respiratory distress.  HEENT exam with widespread dental decay and dry mucous membranes.  He does have evidence of respiratory distress with tachypnea and tachycardia with coarse breath sounds bilaterally.  Abdomen is soft.  His pulses are equal bilaterally.  He does not cooperate with neurologic exam.  GCS of 5 upon arrival.      EKG with sinus tachycardia 132 bpm, normal axis, normal voltage, normal ST segment, and normal T waves.  There is some mild limitation by motion artifact.      Diagnostic labs with evidence of urinary tract infection, elevated troponin, elevated lactate, electrolyte imbalance with renal failure and hypernatremia, hyperbilirubinemia, leukocytosis, and  thrombocytosis      Initial lactate 4.8.      Initial troponin 455.        XR chest 1 view   Final Result   No evidence of acute intrathoracic abnormality. Low lung volumes.        Signed by: Clyde Rod 1/11/2025 7:40 PM   Dictation workstation:   [id number]           The patient initially arrived from the [facility] with paperwork showing that he was a DNR comfort care arrest.  Cultures were obtained and IV fluids and IV antibiotics were ordered for the sepsis bundle.  Rectal acetaminophen was ordered.  Diagnostic imaging was ordered and an EKG was ordered.      The patient's wife was contacted over the phone to confirm his CODE STATUS and to ask questions regarding his baseline mental status.  She states that he does have severe dementia.  She states that the different types of CODE STATUSes are confusing to her.  She states that she does not feel that he would want any intervention.  She states that she feels that he would want to be made comfortable but not do any heroic measures including intubation, CPR, central lines or invasive procedures.  She states that she is on her way to the hospital but would like him to be made comfort care.  She states that she believes that this is consistent with his wishes.  The patient's wife did arrive to the emergency room and confirmed that she would like to make his CODE STATUS DNR CC.  She states that she does not feel that he would want to have any further interventions at this time including IV antibiotics, central line placement, diagnostic imaging or further management.  She states that she would like him to be made comfortable.  She did have a conference call including her daughters on the decision and the family was agreeable to make him comfort care and to withdraw measures at this time given that he reportedly does have a poor quality of life at baseline and his wife feels that he has been suffering for the past 6 months.  She signed paperwork to  make him DNR comfort care and the hospitalist, Dr. Mitchell, agreed to admit the patient and to enter a palliative medicine/hospice consult on the patient.      Impression/diagnosis:  Sepsis  Respiratory failure/hypoxia  Febrile illness  Electrolyte imbalance with hypernatremia  Leukocytosis, unspecified  Elevated troponin  Acute lower urinary tract infection  Renal failure      Critical care time of 33 minutes billed for management of resume sepsis and respiratory failure with initiation of IV fluids, IV antibiotics, obtainment of cultures, initiation of supplemental oxygen, monitoring of the patient on telemetry, consultation with the accepting provider, consultation with the patient's family members.  This time excludes time for billable procedures.      critical care time billed for by me is non concurrent with time billed for by TEJA Guzman      I personally saw the patient and made/approve the management plan and take responsibility for the patient management.      I independently interpreted the following study (S) EKG and diagnostic labs      I personally discussed the patient's management with the patient's wife and EMS      I reviewed the results of the diagnostic labs and diagnostic imaging.  Formal radiology read was completed by the radiologist.      Yancy Daniel MD

## 2025-01-12 NOTE — PROGRESS NOTES
Karl Lopez is a 69 y.o. male on day 1 of admission presenting with Acute on chronic respiratory failure with hypoxia (Multi).      Subjective   Patient lying in bed unresponsible he is awaiting hospice consult and is DNR CC morphine drip was started.       Objective     Last Recorded Vitals  BP (!) 63/42 Comment: nurse notified  Pulse 72   Temp 37.3 °C (99.1 °F) (Axillary)   Resp 22   Wt 63 kg (138 lb 14.2 oz)   SpO2 90%   Intake/Output last 3 Shifts:    Intake/Output Summary (Last 24 hours) at 1/12/2025 1021  Last data filed at 1/12/2025 0947  Gross per 24 hour   Intake 1940 ml   Output --   Net 1940 ml       Admission Weight  Weight: 63 kg (138 lb 14.2 oz) (01/11/25 1853)    Daily Weight  01/11/25 : 63 kg (138 lb 14.2 oz)          Physical Exam  Constitutional: Response to stimuli  HEENT: PERRL, normocephalic, atraumatic, mucous membranes moist  Cardiovascular: Tachycardia, no edema  Respiratory: Lungs clear to auscultation, coarse breath sounds throughout  Gastrointestinal: Bowel sounds positive x 4, soft, nontender  Neurologic: Alert and oriented x 1 equal strength bilaterally               Assessment/Plan                  Assessment & Plan  Acute on chronic respiratory failure with hypoxia (Multi)  Presented with respiratory distress, tachypnea and tachycardia, altered mental status  After admitting doctor spoke to the wife, the patient was made DNR comfort care  Comfort care orders with IV morphine drip  Oxygen as needed for comfort  Hospice consult pending                  Nnamdi Horton, SANDOVAL-CNP

## 2025-01-12 NOTE — ED PROVIDER NOTES
HPI   No chief complaint on file.      HPI  Patient is a 69-year-old male with history of chronic pain syndrome, dementia brought in by EMS from [facility] for concerns of abnormal labs and difficulty breathing as well as slowed mentation over the past day.  Patient arrives diaphoretic and is not able to be aroused even with painful stimuli.  Patient did have outpatient labs drawn today which is significant for a leukocytosis, x-ray was performed without evidence of pneumonia.  EMS reported that the patient's CODE STATUS is DNR/DNI CC arrest and he was not to be intubated.      Patient History   No past medical history on file.  No past surgical history on file.  No family history on file.  Social History     Tobacco Use    Smoking status: Former     Types: Cigarettes    Smokeless tobacco: Never   Vaping Use    Vaping status: Never Used   Substance Use Topics    Alcohol use: Not Currently     Comment: 1 beer a day    Drug use: Never       Physical Exam   ED Triage Vitals   Temperature Heart Rate Respirations BP   01/11/25 1909 01/11/25 1853 01/11/25 1853 01/11/25 1853   (!) 40.7 °C (105.3 °F) (!) 144 (!) 40 (!) 125/94      Pulse Ox Temp Source Heart Rate Source Patient Position   01/11/25 1853 01/11/25 1909 01/11/25 1910 --   (!) 89 % Rectal Monitor       BP Location FiO2 (%)     -- 01/11/25 1929      100 %       Physical Exam  Vitals and nursing note reviewed.   Constitutional:       Comments: Diaphoretic not responding to verbal or painful stimuli   HENT:      Head: Normocephalic and atraumatic.      Mouth/Throat:      Mouth: Mucous membranes are dry.   Cardiovascular:      Rate and Rhythm: Regular rhythm. Tachycardia present.      Heart sounds: Normal heart sounds.   Pulmonary:      Comments: Lungs with rhonchi bilaterally  Abdominal:      General: Abdomen is flat. There is no distension.      Palpations: Abdomen is soft.   Skin:     Comments: Diaphoretic and warm pale skin   Neurological:       Comments: No focal neurologic deficit, not following commands or arousing to painful or verbal stimuli           ED Course & MDM   ED Course as of 01/11/25 2011   Sat Jan 11, 2025   1856 Patient arrives on nonrebreather by EMS, diaphoretic and not responsive to verbal stimuli or sternal rub.  Outpatient labs drawn today with a leukocytosis of 23,000.   [JJ]      ED Course User Index  [JJ] Miranda Guzman PA-C         Diagnoses as of 01/11/25 2011   Acute on chronic respiratory failure with hypoxia (Multi)   Febrile illness   Sepsis, due to unspecified organism, unspecified whether acute organ dysfunction present (Multi)   Electrolyte imbalance                 No data recorded     [place] Coma Scale Score: 9 (01/11/25 1859 : Shannon Aleman RN)                           Medical Decision Making  Parts of this chart have been completed using voice recognition software. Please excuse any errors of transcription.  My thought process and reason for plan has been formulated from the time that I saw the patient until the time of disposition and is not specific to one specific moment during their visit and furthermore my MDM encompasses this entire chart and not only this text box.      HPI: Detailed above.    Exam: A medically appropriate exam performed, outlined above, given the known history and presentation.    History obtained from: Patient    Social Determinants of Health considered during this visit: Coming from skilled nursing facility    Medications given during visit:  Medications   sodium chloride 0.9 % bolus 1,890 mL (1,890 mL intravenous New Bag 1/11/25 1912)   oxygen (O2) therapy (40 L/min inhalation Start 1/11/25 1929)   acetaminophen (Tylenol) suppository 650 mg (650 mg rectal Given 1/11/25 1917)        Diagnostic/tests  Labs Reviewed   CBC WITH AUTO DIFFERENTIAL - Abnormal       Result Value    WBC 25.4 (*)     nRBC 0.0      RBC 6.50 (*)     Hemoglobin 19.2 (*)     Hematocrit 59.6 (*)     MCV 92      MCH  29.5      MCHC 32.2      RDW 15.7 (*)     Platelets 464 (*)     Neutrophils % 85.0      Immature Granulocytes %, Automated 0.6      Lymphocytes % 6.0      Monocytes % 8.0      Eosinophils % 0.0      Basophils % 0.4      Neutrophils Absolute 21.54 (*)     Immature Granulocytes Absolute, Automated 0.16      Lymphocytes Absolute 1.53      Monocytes Absolute 2.03 (*)     Eosinophils Absolute 0.01      Basophils Absolute 0.11 (*)    COMPREHENSIVE METABOLIC PANEL - Abnormal    Glucose 157 (*)     Sodium 163 (*)     Potassium 4.7      Chloride 124 (*)     Bicarbonate 22      Anion Gap 22 (*)     Urea Nitrogen 73 (*)     Creatinine 3.73 (*)     eGFR 17 (*)     Calcium 9.1      Albumin 4.0      Alkaline Phosphatase 71      Total Protein 8.1      AST 34      Bilirubin, Total 1.3 (*)     ALT 51     LACTATE - Abnormal    Lactate 4.8 (*)     Narrative:     Venipuncture immediately after or during the administration of Metamizole may lead to falsely low results. Testing should be performed immediately prior to Metamizole dosing.   URINALYSIS WITH REFLEX CULTURE AND MICROSCOPIC - Abnormal    Color, Urine Yellow      Appearance, Urine Turbid (*)     Specific Gravity, Urine 1.018      pH, Urine 5.0      Protein, Urine 10 (TRACE)      Glucose, Urine Normal      Blood, Urine 0.1 (1+) (*)     Ketones, Urine NEGATIVE      Bilirubin, Urine NEGATIVE      Urobilinogen, Urine 2 (1+) (*)     Nitrite, Urine NEGATIVE      Leukocyte Esterase, Urine 500 Melia/uL (*)    SERIAL TROPONIN-INITIAL - Abnormal    Troponin I, High Sensitivity 455 (*)     Narrative:     Less than 99th percentile of normal range cutoff-  Female and children under 18 years old <14 ng/L; Male <21 ng/L: Negative  Repeat testing should be performed if clinically indicated.     Female and children under 18 years old 14-50 ng/L; Male 21-50 ng/L:  Consistent with possible cardiac damage and possible increased clinical   risk. Serial measurements may help to assess extent of  myocardial damage.     >50 ng/L: Consistent with cardiac damage, increased clinical risk and  myocardial infarction. Serial measurements may help assess extent of   myocardial damage.      NOTE: Children less than 1 year old may have higher baseline troponin   levels and results should be interpreted in conjunction with the overall   clinical context.     NOTE: Troponin I testing is performed using a different   testing methodology at [facility] than at other   [facility]. Direct result comparisons should only   be made within the same method.   MICROSCOPIC ONLY, URINE - Abnormal    WBC, Urine >50 (*)     WBC Clumps, Urine OCCASIONAL      RBC, Urine 6-10 (*)     Bacteria, Urine 1+ (*)     Mucus, Urine FEW      Hyaline Casts, Urine 2+ (*)    LIPASE - Normal    Lipase 54      Narrative:     Venipuncture immediately after or during the administration of Metamizole may lead to falsely low results. Testing should be performed immediately prior to Metamizole dosing.   B-TYPE NATRIURETIC PEPTIDE - Normal    BNP 92      Narrative:        <100 pg/mL - Heart failure unlikely  100-299 pg/mL - Intermediate probability of acute heart                  failure exacerbation. Correlate with clinical                  context and patient history.    >=300 pg/mL - Heart Failure likely. Correlate with clinical                  context and patient history.    BNP testing is performed using different testing methodology at [facility] than at other [facility]. Direct result comparisons should only be made within the same method.      SARS-COV-2 PCR - Normal    Coronavirus 2019, PCR Not Detected      Narrative:     This assay has received FDA Emergency Use Authorization (EUA) and is only authorized for the duration of time that circumstances exist to justify the authorization of the emergency use of in vitro diagnostic tests for the detection of SARS-CoV-2 virus and/or diagnosis of COVID-19 infection  under section 564(b)(1) of the Act, 21 U.S.C. 360bbb-3(b)(1). This assay is an in vitro diagnostic nucleic acid amplification test for the qualitative detection of SARS-CoV-2 from nasopharyngeal specimens and has been validated for use at [facility]. Negative results do not preclude COVID-19 infections and should not be used as the sole basis for diagnosis, treatment, or other management decisions.     INFLUENZA A AND B PCR - Normal    Flu A Result Not Detected      Flu B Result Not Detected      Narrative:     This assay is an in vitro diagnostic multiplex nucleic acid amplification test for the detection and discrimination of Influenza A & B from nasopharyngeal specimens, and has been validated for use at [facility]. Negative results do not preclude Influenza A/B infections, and should not be used as the sole basis for diagnosis, treatment, or other management decisions. If Influenza A/B and RSV PCR results are negative, testing for Parainfluenza virus, Adenovirus and Metapneumovirus is routinely performed for [facility] [place] and intensive care inpatients, and is available on other patients by placing an add-on request.   RSV PCR - Normal    RSV PCR Not Detected      Narrative:     This assay is an FDA-cleared, in vitro diagnostic nucleic acid amplification test for the detection of RSV from nasopharyngeal specimens, and has been validated for use at [facility]. Negative results do not preclude RSV infections, and should not be used as the sole basis for diagnosis, treatment, or other management decisions. If Influenza A/B and RSV PCR results are negative, testing for Parainfluenza virus, Adenovirus and Metapneumovirus is routinely performed for pediatric oncology and intensive care inpatients at [facility], and is available on other patients by placing an add-on request.       BLOOD CULTURE   BLOOD CULTURE   URINE CULTURE   URINALYSIS WITH  REFLEX CULTURE AND MICROSCOPIC    Narrative:     The following orders were created for panel order Urinalysis with Reflex Culture and Microscopic.  Procedure                               Abnormality         Status                     ---------                               -----------         ------                     Urinalysis with Reflex C...[[id number]]  Abnormal            Final result               Extra Urine Gray Tube[[id number]]                            In process                   Please view results for these tests on the individual orders.   TROPONIN SERIES- (INITIAL, 1 HR)    Narrative:     The following orders were created for panel order Troponin I Series, High Sensitivity (0, 1 HR).  Procedure                               Abnormality         Status                     ---------                               -----------         ------                     Troponin I, High Sensiti...[[id number]]  Abnormal            Final result               Troponin, High Sensitivi...[[id number]]                                                   Please view results for these tests on the individual orders.   EXTRA URINE GRAY TUBE   SERIAL TROPONIN, 1 HOUR   LACTATE   URINALYSIS WITH REFLEX CULTURE AND MICROSCOPIC    Narrative:     The following orders were created for panel order Urinalysis with Reflex Culture and Microscopic.  Procedure                               Abnormality         Status                     ---------                               -----------         ------                     Urinalysis with Reflex C...[[id number]]                                                 Extra Urine Gray Tube[[id number]]                                                         Please view results for these tests on the individual orders.   URINALYSIS WITH REFLEX CULTURE AND MICROSCOPIC   EXTRA URINE GRAY TUBE      XR chest 1 view   Final Result   No evidence of acute intrathoracic abnormality. Low lung volumes.         Signed by: Clyde Rod 1/11/2025 7:40 PM   Dictation workstation:   [id number]           Considerations/further MDM:  Patient is a 69-year-old male presenting for evaluation of abnormal labs, respiratory distress    I saw this patient in conjunction with Dr. Daniel    Differential diagnosis associated with the patient presentation includes: Sepsis versus pneumonia versus UTI versus other infectious origin    Patient arrives diaphoretic and not responding to stimuli appears to be in respiratory distress on nonrebreather.  Vital signs with hypotension, fever, tachycardia and tachypnea concerning for sepsis.  Patient provided IV fluids, rectal Tylenol with improvement of vital signs.  CODE STATUS was discussed in length by attending physician with the patient's wife.  CODE STATUS is updated to DNR CC.  The wife is advised that the patient's condition is likely terminal and the goal will be to make the patient comfortable with medication.  The wife does wish to pursue this plan of care of comfort care with plan for hospice  and CODE STATUS is reflected in the chart.  Patient's wife as well as the patient's daughters conferenced and came to the conclusion that patient has been suffering for several months now and would like the patient to be comfortable and pass peacefully and does not wish to pursue active therapy at this time.  Patient admitted to hospitalist service for further management and to pursue hospice care.      Procedure  Procedures     Miranda Guzman PA-C  01/11/25 2011       Miranda Guzman PA-C  01/11/25 2011

## 2025-01-12 NOTE — PROGRESS NOTES
01/12/25 1148   Discharge Planning   Home or Post Acute Services Other (Comment)   Expected Discharge Disposition [facility]   Does the patient need discharge transport arranged? No     Per request of [facility] alix El, sent referral to [facility] to come see patient to flip to [facility] today or tomorrow.   [facility]     Hospice meeting set up to meet with family  0930-1000am Monday 01/13/25.

## 2025-01-12 NOTE — PROGRESS NOTES
Karl Lopez is a 69 y.o. male on day 1 of admission presenting with Acute on chronic respiratory failure with hypoxia (Multi).    RNCC will defer assessment. Family has requested hospice GIP. Hospice will meet with them Monday.   Barbara Ramos RN

## 2025-01-12 NOTE — ASSESSMENT & PLAN NOTE
Presented with respiratory distress, tachypnea and tachycardia, altered mental status  After admitting doctor spoke to the wife, the patient was made DNR comfort care  Comfort care orders with IV morphine drip  Oxygen as needed for comfort  Hospice consult pending

## 2025-01-12 NOTE — ED PROCEDURE NOTE
Procedure  Critical Care    Performed by: Yancy Daniel MD  Authorized by: Yancy Daniel MD    Critical care provider statement:     Critical care time (minutes):  33    Critical care time was exclusive of:  Teaching time and separately billable procedures and treating other patients    Critical care was necessary to treat or prevent imminent or life-threatening deterioration of the following conditions:  Metabolic crisis, sepsis, respiratory failure and renal failure    Critical care was time spent personally by me on the following activities:  Obtaining history from patient or surrogate, review of old charts, re-evaluation of patient's condition, pulse oximetry, ordering and review of radiographic studies, ordering and review of laboratory studies, ordering and performing treatments and interventions, blood draw for specimens, development of treatment plan with patient or surrogate, discussions with consultants, discussions with primary provider, evaluation of patient's response to treatment and examination of patient    Care discussed with: admitting provider    Comments:      Critical care time of 33 minutes billed for management of resume sepsis and respiratory failure with initiation of IV fluids, IV antibiotics, obtainment of cultures, initiation of supplemental oxygen, monitoring of the patient on telemetry, consultation with the accepting provider, consultation with the patient's family members.  This time excludes time for billable procedures.      critical care time billed for by me is non concurrent with time billed for by TEJA Daniel MD  01/11/25 2027

## 2025-01-12 NOTE — H&P
History Of Present Illness  Karl Lopez is a 69 y.o. male presenting with altered mental status and respiratory distress.    This is a 69-year-old male with past medical history of CHF, COPD, pneumonia, chronic respiratory failure, depression, HTN, chronic pain syndrome, memory loss, hypokalemia, hematuria, frequent falls, BPH and other comorbidities presented to the ED by EMS from SNF due to concerns of respiratory distress and worsening AMS.  Unable to get history from the patient, wife present who mentioned that patient typically is AMS at baseline but slightly worsened in the last day.  He does have cognitive decline and dementia with memory loss.  He was recently discharged on 1/3/2025 for frequent falls and unable to take care of himself at home, he was transferred to SNF on discharge.  Per ED note, EMS had reported that patient had been diaphoretic and unresponsive to verbal or painful stimuli on encounter.  Patient had outpatient blood work done today showing leukocytosis 23,000.  X-ray performed prior to ED admission showed no evidence of pneumonia.    In the ED on admission vitals notable for , RR 40, /94, SpO2 89% on 6 L nasal cannula.  Patient was put on 40 L 100% oxygen in the ED.  Labs notable for glucose 157, sodium 163, chloride 124, BUN 73, creatinine 3.73, GFR 17, bilirubin 1.3, lactate 4.8, troponin 455.  WBC 25.4, hemoglobin 19.2, hematocrit 59.6,    Influenza A, influenza B, RSV and COVID all negative    Urinalysis with pyuria versus UTI.    EKG sinus tachycardia 132 bpm, normal axis, normal voltage, normal ST segment and normal T wave, some mild limitation by motion artifact.    Chest x-ray:  No evidence of acute intrathoracic abnormality. Low lung volumes     CODE STATUS was DNR/DNI CC arrest and no intubation, in the ED the wife changed CODE STATUS to DNR comfort care in the emergency department and will sign document.    Patient admitted for comfort care measures     Past  Medical History  No past medical history on file.    Surgical History  No past surgical history on file.     Social History  He reports that he has quit smoking. His smoking use included cigarettes. He has never used smokeless tobacco. He reports that he does not currently use alcohol. He reports that he does not use drugs.    Family History  No family history on file.     Allergies  Latex, Talwin compound, Nsaids (non-steroidal anti-inflammatory drug), and Pentazocine    Review of Systems  Unable to get a ROS due to AMS    Physical Exam   General: Nonresponsive   HENT: Dental decay and poor oral hygiene.  External ears normal, no rhinorrhea   Eyes: no icterus or injection, no discharge   Lungs: Coarse breath sounds throughout   Heart: Tachycardia, no murmurs, no LE edema BL   GI: abdomen soft, nontender, nondistended, BS present   MSK: no joint effusion or deformity   Skin: no rashes, erythema, or ecchymosis   Neuro: Graham Coma Scale score 5    Last Recorded Vitals  Blood pressure 85/54, pulse (!) 102, temperature (!) 40.7 °C (105.3 °F), temperature source Rectal, resp. rate (!) 35, height 1.829 m (6'), weight 63 kg (138 lb 14.2 oz), SpO2 (!) 89%.    Relevant Results  Scheduled medications  morphine, 2 mg, intravenous, q4h  oxygen, , inhalation, Continuous - Inhalation  oxygen, , inhalation, Continuous - Inhalation      Continuous medications     PRN medications  PRN medications: acetaminophen, albuterol, haloperidol, ketorolac, morphine, morphine, morphine  Results for orders placed or performed during the hospital encounter of 01/11/25 (from the past 24 hours)   CBC and Auto Differential   Result Value Ref Range    WBC 25.4 (H) 4.4 - 11.3 x10*3/uL    nRBC 0.0 0.0 - 0.0 /100 WBCs    RBC 6.50 (H) 4.50 - 5.90 x10*6/uL    Hemoglobin 19.2 (H) 13.5 - 17.5 g/dL    Hematocrit 59.6 (H) 41.0 - 52.0 %    MCV 92 80 - 100 fL    MCH 29.5 26.0 - 34.0 pg    MCHC 32.2 32.0 - 36.0 g/dL    RDW 15.7 (H) 11.5 - 14.5 %    Platelets  464 (H) 150 - 450 x10*3/uL    Neutrophils % 85.0 40.0 - 80.0 %    Immature Granulocytes %, Automated 0.6 0.0 - 0.9 %    Lymphocytes % 6.0 13.0 - 44.0 %    Monocytes % 8.0 2.0 - 10.0 %    Eosinophils % 0.0 0.0 - 6.0 %    Basophils % 0.4 0.0 - 2.0 %    Neutrophils Absolute 21.54 (H) 1.20 - 7.70 x10*3/uL    Immature Granulocytes Absolute, Automated 0.16 0.00 - 0.70 x10*3/uL    Lymphocytes Absolute 1.53 1.20 - 4.80 x10*3/uL    Monocytes Absolute 2.03 (H) 0.10 - 1.00 x10*3/uL    Eosinophils Absolute 0.01 0.00 - 0.70 x10*3/uL    Basophils Absolute 0.11 (H) 0.00 - 0.10 x10*3/uL   Comprehensive metabolic panel   Result Value Ref Range    Glucose 157 (H) 74 - 99 mg/dL    Sodium 163 (HH) 136 - 145 mmol/L    Potassium 4.7 3.5 - 5.3 mmol/L    Chloride 124 (H) 98 - 107 mmol/L    Bicarbonate 22 21 - 32 mmol/L    Anion Gap 22 (H) 10 - 20 mmol/L    Urea Nitrogen 73 (H) 6 - 23 mg/dL    Creatinine 3.73 (H) 0.50 - 1.30 mg/dL    eGFR 17 (L) >60 mL/min/1.73m*2    Calcium 9.1 8.6 - 10.3 mg/dL    Albumin 4.0 3.4 - 5.0 g/dL    Alkaline Phosphatase 71 33 - 136 U/L    Total Protein 8.1 6.4 - 8.2 g/dL    AST 34 9 - 39 U/L    Bilirubin, Total 1.3 (H) 0.0 - 1.2 mg/dL    ALT 51 10 - 52 U/L   Lipase   Result Value Ref Range    Lipase 54 9 - 82 U/L   Lactate   Result Value Ref Range    Lactate 4.8 (HH) 0.4 - 2.0 mmol/L   B-Type Natriuretic Peptide   Result Value Ref Range    BNP 92 0 - 99 pg/mL   Troponin I, High Sensitivity, Initial   Result Value Ref Range    Troponin I, High Sensitivity 455 (HH) 0 - 20 ng/L   Sars-CoV-2 PCR   Result Value Ref Range    Coronavirus 2019, PCR Not Detected Not Detected   Influenza A, and B PCR   Result Value Ref Range    Flu A Result Not Detected Not Detected    Flu B Result Not Detected Not Detected   RSV PCR   Result Value Ref Range    RSV PCR Not Detected Not Detected   Urinalysis with Reflex Culture and Microscopic   Result Value Ref Range    Color, Urine Yellow Light-Yellow, Yellow, Dark-Yellow    Appearance,  Urine Turbid (N) Clear    Specific Gravity, Urine 1.018 1.005 - 1.035    pH, Urine 5.0 5.0, 5.5, 6.0, 6.5, 7.0, 7.5, 8.0    Protein, Urine 10 (TRACE) NEGATIVE, 10 (TRACE), 20 (TRACE) mg/dL    Glucose, Urine Normal Normal mg/dL    Blood, Urine 0.1 (1+) (A) NEGATIVE    Ketones, Urine NEGATIVE NEGATIVE mg/dL    Bilirubin, Urine NEGATIVE NEGATIVE    Urobilinogen, Urine 2 (1+) (A) Normal mg/dL    Nitrite, Urine NEGATIVE NEGATIVE    Leukocyte Esterase, Urine 500 Melia/uL (A) NEGATIVE   Microscopic Only, Urine   Result Value Ref Range    WBC, Urine >50 (A) 1-5, NONE /HPF    WBC Clumps, Urine OCCASIONAL Reference range not established. /HPF    RBC, Urine 6-10 (A) NONE, 1-2, 3-5 /HPF    Bacteria, Urine 1+ (A) NONE SEEN /HPF    Mucus, Urine FEW Reference range not established. /LPF    Hyaline Casts, Urine 2+ (A) NONE /LPF     XR chest 1 view    Result Date: 1/11/2025  Interpreted By:  Clyde Rod, STUDY: XR CHEST 1 VIEW   INDICATION: Signs/Symptoms:dyspnea.   COMPARISON: July 2, 2024   ACCESSION NUMBER(S): [id number]   ORDERING CLINICIAN: JANET OCAMPO   FINDINGS: Slightly low lung volumes. No consolidation, effusion, edema, pneumothorax.       No evidence of acute intrathoracic abnormality. Low lung volumes.   Signed by: Clyde Rod 1/11/2025 7:40 PM Dictation workstation:   [id number]    ECG 12 lead    Result Date: 1/6/2025  Poor data quality, interpretation may be adversely affected Normal sinus rhythm Nonspecific ST abnormality Abnormal ECG When compared with ECG of 30-DEC-2024 19:16, (unconfirmed) No significant change was found Confirmed by Jolynn Gillespie ([id number]) on 1/6/2025 3:59:31 PM    ECG 12 lead    Result Date: 1/6/2025  Normal sinus rhythm Normal ECG When compared with ECG of 30-DEC-2024 18:22, (unconfirmed) No significant change was found Confirmed by Jolynn Gillespie ([id number]) on 1/6/2025 8:06:59 AM    CT head wo IV contrast    Result Date: 1/2/2025  Interpreted By:  Thomas Talavera, STUDY: CT HEAD WO IV CONTRAST;   1/2/2025 12:57 pm   INDICATION: Signs/Symptoms:fall.   COMPARISON: Comparison study is from 12/07/2024..   ACCESSION NUMBER(S): [id number]   ORDERING CLINICIAN: DOMENICA COLORADO   TECHNIQUE: Routine axial images were obtained from the skull base through the vertex.  Sagittal and coronal reconstruction images were generated. Brain, subdural, and bone windows were reviewed. N/A   N/A   FINDINGS: INTRACRANIAL: Mild prominence of ventricles and sulci. There is mild patchy hypodensity throughout the deep periventricular white matter. No acute intracranial bleed, midline shift, or focal mass effect. No destructive bone lesion. No depressed skull fracture. Skullbase arterial calcifications in the carotid siphons and vertebral arteries.   EXTRACRANIAL: Persistent opacification of the left maxillary sinus with partially calcified material. Stable osseous wall thickening of the left maxillary sinus. Scant mucosal thickening in several bilateral ethmoid air cells. Otherwise, the dot visualized paranasal sinuses were clear. Visualized mastoid air cells were clear.       No depressed skull fracture. No acute intracranial bleed or focal mass effect.   Mild volume loss.   Mild chronic white matter ischemic disease in the deep periventricular regions.   Stable chronic left maxillary sinusitis.   MACRO: None   Signed by: Thomas Talavera 1/2/2025 1:35 PM Dictation workstation:   [id number]    CT lumbar spine wo IV contrast    Result Date: 1/2/2025  Interpreted By:  Thomas Talavera, STUDY: CT LUMBAR SPINE WO IV CONTRAST;  1/2/2025 12:57 pm   INDICATION: Signs/Symptoms:back pain s/p fall.   COMPARISON: Comparison study is from 12/07/2024..   ACCESSION NUMBER(S): [id number]   ORDERING CLINICIAN: DOMENICA COLORADO   TECHNIQUE: Thin section axial images were obtained from T11 down through the mid sacrum. Sagittal and coronal reconstruction images were generated. Soft tissue and bone windows were reviewed.   FINDINGS: VERTEBRAL BODIES AND  POSTERIOR ELEMENTS: There are stable and intact pedicle screws and fixation rods in place at L4 and L5. L4-5 laminectomy defect. There is fusion of the bodies of L4 and L5. There is moderate disc space loss with endplate osteophytosis and vacuum disc phenomenon at L3-4 and L4-5 with mild changes at L1-2. There is mild-to-moderate disc space narrowing with endplate spurring in the imaged distal thoracic spine. Grade 1 retrolisthesis of L2 over L3 and of L3 over L4. Interfacet hypertrophy with spurring at L5-S1. There is slight levoscoliosis centered at L3. No compression fracture or posterior element fracture.  No destructive bone lesion. There are remote surgical changes involving the lateral aspect of the left iliac wing. There are sclerotic arthritic changes in both SI joints.   SPINAL CANAL: Mild circumferential spinal stenosis caused by arthritic changes described in addition to ligamentum flavum hypertrophy and mild posterior disc bulge. No gross exiting nerve root compression. There is mild-to-moderate circumferential spinal stenosis at L3-4 caused by arthritic changes described in addition to posterior disc bulge and ligamentum flavum hypertrophy. There is left-sided foraminal narrowing at L3-4. No gross focal disc herniation in this unenhanced exam.   SOFT TISSUES: Within normal limits.   ABDOMEN/PELVIS: The imaged portions of the abdomen and pelvis were negative.       Surgical and arthritic changes in the lumbosacral spine as described. Please see above for details.   MACRO: None   Signed by: Thomas Talavera 1/2/2025 1:32 PM Dictation workstation:   [id number]    CT cervical spine wo IV contrast    Result Date: 1/2/2025  Interpreted By:  Thomas Talavera, STUDY: CT CERVICAL SPINE WO IV CONTRAST;  1/2/2025 12:57 pm   INDICATION: Signs/Symptoms:fall.     COMPARISON: Prior exam is from 12/07/2024.   ACCESSION NUMBER(S): [id number]   ORDERING CLINICIAN: DOMENICA COLORADO   TECHNIQUE: Thin section axial images were  obtained from the skull base down through the thoracic inlet. Sagittal and coronal reconstruction images were generated. Soft tissue, lung, and bone windows were reviewed.   FINDINGS: VERTEBRAL BODIES AND POSTERIOR ELEMENTS: Joint space loss with spurring in the anterior superior aspect of the atlantoaxial joint. Thickening and calcification of the transverse ligament posterior to the dens. Mild endplate osteophytosis at C2-3. There is mild-to-moderate disc space narrowing with endplate osteophytosis from C3-4 through T1-2. Multilevel interfacet hypertrophy with spur formation. Multilevel uncovertebral hypertrophy with spur formation. No cervical spine compression fracture. No posterior element fracture. No destructive bone lesion. No listhesis.   SPINAL CANAL: No gross disc herniation.   NECK SOFT TISSUES: Within normal limits.   LUNG APICES: Imaged portion of the lung apices are within normal limits.   SKULL BASE: Within normal limits.       Cervical spine DJD throughout as described. No CT evidence of cervical spine fracture in this exam.   MACRO: None   Signed by: Tohmas Talavera 1/2/2025 1:24 PM Dictation workstation:   [id number]    XR lumbar spine 2-3 views    Result Date: 12/30/2024  Interpreted By:  Ebonie Land, STUDY: Lumbar spine, three views.   INDICATION: Signs/Symptoms:low back pain.   COMPARISON: 01/25/2007   ACCESSION NUMBER(S): [id number]   ORDERING CLINICIAN: GRETA ROSAS   FINDINGS: Moderate S shaped thoracolumbar scoliosis. Grade 1 L2-3 and L3-4 retrolisthesis. Instrumented fusion changes at L4-5 without hardware complication. Severe L2-3 and L3-4 degenerative changes with disc height loss and osteophytes. Moderate T12-L1 degenerative changes as well.   Vertebral body heights are preserved. Posterior elements are intact.       1. Advanced multilevel degenerative changes as above.   MACRO: None.   Signed by: Ebonie Land 12/30/2024 9:01 PM Dictation workstation:   [id number]         Assessment/Plan     This is a 69-year-old male with multiple comorbidities presented to the ED by EMS from SNF with concerns of respiratory distress and worsening altered mental status.  Wife was present changed CODE STATUS to DNR comfort care.    Assessment & Plan  Acute on chronic respiratory failure with hypoxia (Multi)  Initially placed on 40 L 100% oxygen, comfort care orders placed wean down to 2 L nasal cannula.  Comfort care orders with IV pain medications    Acute metabolic encephalopathy    Elevated troponin    Tachypnea    Tachycardia    Hypernatremia    Leukocytosis    Suspected UTI    Hyperbilirubinemia    Thrombocytosis        Presented with respiratory distress, with tachypnea and tachycardia, unable to perform neurological exam.    In the emergency department wife signed DNR comfort care measures stating that both the patient and herself did not wish for any of this and they are okay with no treatments.  I explained comfort care measures to the patient and she expressed her understanding.  Unfortunately I was unable to secure a private room for this patient due to room availability.     CODE STATUS: DNR comfort care    I spent 45 minutes in the professional and overall care of this patient.      Dimas Mitchell MD

## 2025-01-12 NOTE — CARE PLAN
The patient's goals for the shift include maintain adequate pain control    The clinical goals for the shift include maintain comfortability

## 2025-01-12 NOTE — CONSULTS
Inpatient consult to Palliative Care  Consult performed by: FLACO Howell  Consult ordered by: FLACO Storm      Patient Location   [address]  Reason For Consult  Reason for Consult: symptom management and patient/family support     History Of Present Illness  Karl Lopez is a 69 y.o. male with past medical history of  CHF, COPD, pneumonia, chronic respiratory failure, depression, HTN, chronic pain syndrome, memory loss, hypokalemia, hematuria, frequent falls, BPH and other comorbidities presented to the ED by EMS from [facility] ([facility]) due to concerns of respiratory distress and worsening AMS.  According to spouse, he was there for only one week following last hospitalization. He does have cognitive decline and dementia with memory loss according to pt spouse.  He was recently discharged on 1/3/2025 for frequent falls and unable to take care of himself at home, he was transferred to SNF on discharge.  Per ED note, EMS had reported that patient had been diaphoretic and unresponsive to verbal or painful stimuli on encounter.  Patient had outpatient blood work done today showing leukocytosis 23,000.  X-ray performed prior to ED admission showed no evidence of pneumonia.     In the ED on admission vitals notable for , RR 40, /94, SpO2 89% on 6 L nasal cannula.  Patient was put on 40 L 100% oxygen in the ED.  Labs notable for glucose 157, sodium 163, chloride 124, BUN 73, creatinine 3.73, GFR 17, bilirubin 1.3, lactate 4.8, troponin 455.  WBC 25.4, hemoglobin 19.2, hematocrit 59.6,     Influenza A, influenza B, RSV and COVID all negative     Urinalysis with pyuria versus UTI.     EKG sinus tachycardia 132 bpm, normal axis, normal voltage, normal ST segment and normal T wave, some mild limitation by motion artifact.     Chest x-ray:  No evidence of acute intrathoracic abnormality. Low lung volumes      Pt spouse changed code status to DNR comfort measures only and  requested palliative/hospice consult. States that patient has had repeated hospitalizations over past year and wants to shift towards comfort model of care.      Past Medical History  He has no past medical history on file.    Surgical History  He has no past surgical history on file.     Social History  He reports that he has quit smoking. His smoking use included cigarettes. He has never used smokeless tobacco. He reports that he does not currently use alcohol. He reports that he does not use drugs.    Caregiving/Caregiver Support  Does the patient require assistance in some or all components of his care, including coordination of medical care? Yes  If Yes, which person serves that role?  caregiver and spouse   Caregiver emotional or practical needs:  both     Family History  No family history on file.    Allergies  Latex, Talwin compound, Nsaids (non-steroidal anti-inflammatory drug), and Pentazocine    Scheduled medications  oxygen, , inhalation, Continuous - Inhalation  oxygen, , inhalation, Continuous - Inhalation      Continuous medications  morphine, 0.5-10 mg/hr, Last Rate: 2 mg/hr (01/12/25 0949)      PRN medications  PRN medications: acetaminophen, albuterol, haloperidol, hyoscyamine     Relevant Results  Bun 73, creat 3.73, wbc 25.4, hgb 19.2, plt 464  Results for orders placed or performed during the hospital encounter of 01/11/25 (from the past 24 hours)   CBC and Auto Differential   Result Value Ref Range    WBC 25.4 (H) 4.4 - 11.3 x10*3/uL    nRBC 0.0 0.0 - 0.0 /100 WBCs    RBC 6.50 (H) 4.50 - 5.90 x10*6/uL    Hemoglobin 19.2 (H) 13.5 - 17.5 g/dL    Hematocrit 59.6 (H) 41.0 - 52.0 %    MCV 92 80 - 100 fL    MCH 29.5 26.0 - 34.0 pg    MCHC 32.2 32.0 - 36.0 g/dL    RDW 15.7 (H) 11.5 - 14.5 %    Platelets 464 (H) 150 - 450 x10*3/uL    Neutrophils % 85.0 40.0 - 80.0 %    Immature Granulocytes %, Automated 0.6 0.0 - 0.9 %    Lymphocytes % 6.0 13.0 - 44.0 %    Monocytes % 8.0 2.0 - 10.0 %    Eosinophils %  0.0 0.0 - 6.0 %    Basophils % 0.4 0.0 - 2.0 %    Neutrophils Absolute 21.54 (H) 1.20 - 7.70 x10*3/uL    Immature Granulocytes Absolute, Automated 0.16 0.00 - 0.70 x10*3/uL    Lymphocytes Absolute 1.53 1.20 - 4.80 x10*3/uL    Monocytes Absolute 2.03 (H) 0.10 - 1.00 x10*3/uL    Eosinophils Absolute 0.01 0.00 - 0.70 x10*3/uL    Basophils Absolute 0.11 (H) 0.00 - 0.10 x10*3/uL   Comprehensive metabolic panel   Result Value Ref Range    Glucose 157 (H) 74 - 99 mg/dL    Sodium 163 (HH) 136 - 145 mmol/L    Potassium 4.7 3.5 - 5.3 mmol/L    Chloride 124 (H) 98 - 107 mmol/L    Bicarbonate 22 21 - 32 mmol/L    Anion Gap 22 (H) 10 - 20 mmol/L    Urea Nitrogen 73 (H) 6 - 23 mg/dL    Creatinine 3.73 (H) 0.50 - 1.30 mg/dL    eGFR 17 (L) >60 mL/min/1.73m*2    Calcium 9.1 8.6 - 10.3 mg/dL    Albumin 4.0 3.4 - 5.0 g/dL    Alkaline Phosphatase 71 33 - 136 U/L    Total Protein 8.1 6.4 - 8.2 g/dL    AST 34 9 - 39 U/L    Bilirubin, Total 1.3 (H) 0.0 - 1.2 mg/dL    ALT 51 10 - 52 U/L   Lipase   Result Value Ref Range    Lipase 54 9 - 82 U/L   Lactate   Result Value Ref Range    Lactate 4.8 (HH) 0.4 - 2.0 mmol/L   B-Type Natriuretic Peptide   Result Value Ref Range    BNP 92 0 - 99 pg/mL   Troponin I, High Sensitivity, Initial   Result Value Ref Range    Troponin I, High Sensitivity 455 (HH) 0 - 20 ng/L   Sars-CoV-2 PCR   Result Value Ref Range    Coronavirus 2019, PCR Not Detected Not Detected   Influenza A, and B PCR   Result Value Ref Range    Flu A Result Not Detected Not Detected    Flu B Result Not Detected Not Detected   RSV PCR   Result Value Ref Range    RSV PCR Not Detected Not Detected   Urinalysis with Reflex Culture and Microscopic   Result Value Ref Range    Color, Urine Yellow Light-Yellow, Yellow, Dark-Yellow    Appearance, Urine Turbid (N) Clear    Specific Gravity, Urine 1.018 1.005 - 1.035    pH, Urine 5.0 5.0, 5.5, 6.0, 6.5, 7.0, 7.5, 8.0    Protein, Urine 10 (TRACE) NEGATIVE, 10 (TRACE), 20 (TRACE) mg/dL    Glucose,  Urine Normal Normal mg/dL    Blood, Urine 0.1 (1+) (A) NEGATIVE    Ketones, Urine NEGATIVE NEGATIVE mg/dL    Bilirubin, Urine NEGATIVE NEGATIVE    Urobilinogen, Urine 2 (1+) (A) Normal mg/dL    Nitrite, Urine NEGATIVE NEGATIVE    Leukocyte Esterase, Urine 500 Melia/uL (A) NEGATIVE   Extra Urine Gray Tube   Result Value Ref Range    Extra Tube Hold for add-ons.    Microscopic Only, Urine   Result Value Ref Range    WBC, Urine >50 (A) 1-5, NONE /HPF    WBC Clumps, Urine OCCASIONAL Reference range not established. /HPF    RBC, Urine 6-10 (A) NONE, 1-2, 3-5 /HPF    Bacteria, Urine 1+ (A) NONE SEEN /HPF    Mucus, Urine FEW Reference range not established. /LPF    Hyaline Casts, Urine 2+ (A) NONE /LPF      [unfilled]     Review of Systems   Reason unable to perform ROS: pt unresponsive.     Serious Illness Conversation - pt unable to participate  What is your understanding now of where you are with your illness:  NA  How much information about what is likely to be ahead with your illness  would you like from me: NA  What are your most important goals if your health situation worsens:  NA  What are your biggest fears and worries about the future with your health:  NA  What gives you strength as you think about the future with your illness:  NA  What abilities are so critical to your life that you can’t imagine living without them:  NA  If you become sicker, how much are you willing to go through for the possibility of gaining more time:  NA  How much does your family know about your priorities and wishes:  NA     Physical Exam  Vitals and nursing note reviewed.   Constitutional:       General: He is not in acute distress.     Appearance: He is ill-appearing.      Comments: Thin and frail appearing elderly male   HENT:      Mouth/Throat:      Mouth: Mucous membranes are dry.      Pharynx: Oropharynx is clear.   Cardiovascular:      Rate and Rhythm: Normal rate and regular rhythm.      Pulses: Normal pulses.      Heart sounds:  Normal heart sounds.   Pulmonary:      Effort: Pulmonary effort is normal. No respiratory distress.      Breath sounds: No wheezing.      Comments: Diminished lung sounds  Abdominal:      General: Abdomen is flat. There is no distension.      Palpations: Abdomen is soft.   Musculoskeletal:      Right lower leg: No edema.      Left lower leg: No edema.   Skin:     General: Skin is warm and dry.   Neurological:      Comments: Pt responsive only to tactile stimuli         Last Recorded Vitals  Blood pressure (!) 63/42, pulse 72, temperature 37.3 °C (99.1 °F), temperature source Axillary, resp. rate 22, height 1.829 m (6'), weight 63 kg (138 lb 14.2 oz), SpO2 90%.      PALLIATIVE MEDICINE PALLIATIVE PERFORMANCE SCALE     Palliative Performance Scale % (PPS) 10%    Oral Intake Severely reduced (< or = mouthfuls) (2.5)   Edema Absent (0)   Dyspnea at Rest Present (3.5)   Delirium Present (4.0)   Palliative Prognostic Index (PPI) Total Score >10   Note: The scores from each prognostic domain are added.  A score of 0 to 2.0 was associated with a median survival of 90 days; score of 2.1 to 4.0 is 61 days, and score of >4.0 is 12 days.        Assessment/Plan   IMP:    Acute on chronic resp failure with hypoxia - on nasal canula, no escalation of care, comfort measures only  Dimitrios - as above, no future lab draws, no dialysis    Palliative Care Encounter  DNR comfort measures only  Pt is not capable, he is unresponsive  Spouse Libertad is HPOA. Documents are in Epic. She was present at the bedside today along with two other family/friends.  Confirmed goals of care with spouse that are comfort measures only. No interest in heroic efforts or escalation of care. They would like hospice involved for family support. We will cont to follow for symptoms management.   D/w Care coordinator and referral sent to [facility]. They will meet tomorrow at bedside [phone] and transition patient to inpatient hospice.     Code status:   DNR-comfort care  only  Patient/proxy preference for information  Prefers full information    Provider estimate of survival: days to weeks  Patient Prognostic Awareness: Patient unable to respond    Is the patient hospice-eligible?   Yes  Was a discussion held re hospice services?   yes  Was a decision made re hospice services?  Yes    Goals of Care  Symptom management only  Symptom Management  Pain: calm and comfortable on morphine gtt  Medications recommended for pain?  Yes  Tiredness: NA  Nausea: none  Depression: NA  Anxiety: calm  Drowsiness: unresponsive patient  Appetite: NPO  Wellbeing: poor  Dyspnea: yes  Intervention recommended for dyspnea?  yes  Other: na  Intervention recommended for constipation?  No    Thank you for this consultation. We will follow.    I spent 90 minutes in the professional and overall care of this patient.    D/w Nnamdi Horton CNP and JORDYN Anderson.    Sienna Sumner APRN-CNP

## 2025-01-13 ENCOUNTER — HOSPITAL ENCOUNTER (INPATIENT)
Facility: HOSPITAL | Age: 70
LOS: 2 days | Discharge: HOSPICE | End: 2025-01-15
Attending: INTERNAL MEDICINE | Admitting: INTERNAL MEDICINE
Payer: OTHER MISCELLANEOUS

## 2025-01-13 VITALS
HEIGHT: 72 IN | OXYGEN SATURATION: 92 % | WEIGHT: 138.89 LBS | HEART RATE: 68 BPM | RESPIRATION RATE: 10 BRPM | TEMPERATURE: 98.1 F | DIASTOLIC BLOOD PRESSURE: 53 MMHG | SYSTOLIC BLOOD PRESSURE: 76 MMHG | BODY MASS INDEX: 18.81 KG/M2

## 2025-01-13 DIAGNOSIS — J96.21 ACUTE ON CHRONIC RESPIRATORY FAILURE WITH HYPOXIA (MULTI): ICD-10-CM

## 2025-01-13 DIAGNOSIS — A41.9 SEPSIS, DUE TO UNSPECIFIED ORGANISM, UNSPECIFIED WHETHER ACUTE ORGAN DYSFUNCTION PRESENT (MULTI): ICD-10-CM

## 2025-01-13 PROBLEM — J96.20 ACUTE ON CHRONIC RESPIRATORY FAILURE (MULTI): Status: ACTIVE | Noted: 2025-01-13

## 2025-01-13 LAB
ATRIAL RATE: 132 BPM
BACTERIA UR CULT: NO GROWTH
P AXIS: 26 DEGREES
P OFFSET: 218 MS
P ONSET: 176 MS
PR INTERVAL: 126 MS
Q ONSET: 229 MS
QRS COUNT: 21 BEATS
QRS DURATION: 62 MS
QT INTERVAL: 296 MS
QTC CALCULATION(BAZETT): 438 MS
QTC FREDERICIA: 385 MS
R AXIS: 18 DEGREES
T AXIS: 53 DEGREES
T OFFSET: 377 MS
VENTRICULAR RATE: 132 BPM

## 2025-01-13 PROCEDURE — 99232 SBSQ HOSP IP/OBS MODERATE 35: CPT | Performed by: NURSE PRACTITIONER

## 2025-01-13 PROCEDURE — 2500000004 HC RX 250 GENERAL PHARMACY W/ HCPCS (ALT 636 FOR OP/ED): Performed by: NURSE PRACTITIONER

## 2025-01-13 PROCEDURE — 1250000001 HC HOSPICE SEMI-PRIVATE ROOM DAILY

## 2025-01-13 RX ORDER — LORAZEPAM 2 MG/ML
1 INJECTION INTRAMUSCULAR EVERY 2 HOUR PRN
Status: DISCONTINUED | OUTPATIENT
Start: 2025-01-13 | End: 2025-01-15 | Stop reason: HOSPADM

## 2025-01-13 RX ORDER — ACETAMINOPHEN 650 MG/1
650 SUPPOSITORY RECTAL EVERY 4 HOURS PRN
Status: DISCONTINUED | OUTPATIENT
Start: 2025-01-13 | End: 2025-01-15 | Stop reason: HOSPADM

## 2025-01-13 RX ORDER — MORPHINE SULFATE/0.9% NACL/PF 1 MG/ML
.5-1 PLASTIC BAG, INJECTION (ML) INTRAVENOUS CONTINUOUS
Status: CANCELLED | OUTPATIENT
Start: 2025-01-13

## 2025-01-13 RX ORDER — ALBUTEROL SULFATE 0.83 MG/ML
2.5 SOLUTION RESPIRATORY (INHALATION) EVERY 4 HOURS PRN
Status: DISCONTINUED | OUTPATIENT
Start: 2025-01-13 | End: 2025-01-15 | Stop reason: HOSPADM

## 2025-01-13 RX ORDER — HALOPERIDOL 2 MG/ML
1 SOLUTION ORAL EVERY 4 HOURS PRN
Status: CANCELLED | OUTPATIENT
Start: 2025-01-13

## 2025-01-13 RX ORDER — ACETAMINOPHEN 650 MG/1
650 SUPPOSITORY RECTAL EVERY 4 HOURS PRN
Status: CANCELLED | OUTPATIENT
Start: 2025-01-13

## 2025-01-13 RX ORDER — LORAZEPAM 2 MG/ML
1 INJECTION INTRAMUSCULAR EVERY 2 HOUR PRN
Status: CANCELLED | OUTPATIENT
Start: 2025-01-13

## 2025-01-13 RX ORDER — HYOSCYAMINE SULFATE 0.12 MG/1
0.12 TABLET, ORALLY DISINTEGRATING ORAL EVERY 4 HOURS PRN
Status: CANCELLED | OUTPATIENT
Start: 2025-01-13

## 2025-01-13 RX ORDER — BISACODYL 10 MG/1
10 SUPPOSITORY RECTAL DAILY PRN
Status: DISCONTINUED | OUTPATIENT
Start: 2025-01-13 | End: 2025-01-15 | Stop reason: HOSPADM

## 2025-01-13 RX ORDER — MORPHINE SULFATE/0.9% NACL/PF 1 MG/ML
.5-1 PLASTIC BAG, INJECTION (ML) INTRAVENOUS CONTINUOUS
Status: DISCONTINUED | OUTPATIENT
Start: 2025-01-13 | End: 2025-01-15 | Stop reason: HOSPADM

## 2025-01-13 RX ORDER — ONDANSETRON HYDROCHLORIDE 2 MG/ML
4 INJECTION, SOLUTION INTRAVENOUS EVERY 6 HOURS PRN
Status: CANCELLED | OUTPATIENT
Start: 2025-01-13

## 2025-01-13 RX ORDER — HALOPERIDOL 2 MG/ML
1 SOLUTION ORAL EVERY 4 HOURS PRN
Status: DISCONTINUED | OUTPATIENT
Start: 2025-01-13 | End: 2025-01-15 | Stop reason: HOSPADM

## 2025-01-13 RX ORDER — ALBUTEROL SULFATE 0.83 MG/ML
2.5 SOLUTION RESPIRATORY (INHALATION) EVERY 4 HOURS PRN
Status: CANCELLED | OUTPATIENT
Start: 2025-01-13

## 2025-01-13 RX ORDER — ONDANSETRON HYDROCHLORIDE 2 MG/ML
4 INJECTION, SOLUTION INTRAVENOUS EVERY 6 HOURS PRN
Status: DISCONTINUED | OUTPATIENT
Start: 2025-01-13 | End: 2025-01-15 | Stop reason: HOSPADM

## 2025-01-13 RX ORDER — BISACODYL 10 MG/1
10 SUPPOSITORY RECTAL DAILY PRN
Status: CANCELLED | OUTPATIENT
Start: 2025-01-13

## 2025-01-13 RX ORDER — HYOSCYAMINE SULFATE 0.12 MG/1
0.12 TABLET, ORALLY DISINTEGRATING ORAL EVERY 4 HOURS PRN
Status: DISCONTINUED | OUTPATIENT
Start: 2025-01-13 | End: 2025-01-15 | Stop reason: HOSPADM

## 2025-01-13 RX ADMIN — Medication 2 MG/HR: at 09:23

## 2025-01-13 ASSESSMENT — COGNITIVE AND FUNCTIONAL STATUS - GENERAL
HELP NEEDED FOR BATHING: TOTAL
DAILY ACTIVITIY SCORE: 6
TOILETING: TOTAL
DRESSING REGULAR LOWER BODY CLOTHING: TOTAL
MOVING FROM LYING ON BACK TO SITTING ON SIDE OF FLAT BED WITH BEDRAILS: TOTAL
STANDING UP FROM CHAIR USING ARMS: TOTAL
PERSONAL GROOMING: TOTAL
CLIMB 3 TO 5 STEPS WITH RAILING: TOTAL
WALKING IN HOSPITAL ROOM: TOTAL
MOBILITY SCORE: 6
DRESSING REGULAR UPPER BODY CLOTHING: TOTAL
MOVING TO AND FROM BED TO CHAIR: TOTAL
EATING MEALS: TOTAL
TURNING FROM BACK TO SIDE WHILE IN FLAT BAD: TOTAL

## 2025-01-13 ASSESSMENT — PAIN SCALES - GENERAL: PAINLEVEL_OUTOF10: 0 - NO PAIN

## 2025-01-13 NOTE — PROGRESS NOTES
Karl Lopez is a 69 y.o. male on day 2 of admission presenting with Acute on chronic respiratory failure with hypoxia (Multi).    Subjective   Symptoms (0 - 10, Best to Worst)  [facility]all Symptom Assessment System  0-10 (Numeric) Pain Score: 0 - No pain  Resting comfortably in bed on morphine drip, blood pressure low, respiratory rate 10-14 with periods of apnea, irregular, no grimacing.  Family at bedside reports facial grimacing during incontinence care earlier otherwise unresponsive.       Objective     Vitals and nursing note reviewed.   Constitutional:       General: He is not in acute distress.     Appearance: He is ill-appearing.      Comments: Thin and frail appearing elderly male   HENT:      Mouth/Throat:      Mouth: Mucous membranes are dry.      Pharynx: Oropharynx is clear.   Cardiovascular:      Rate and Rhythm: Normal rate and regular rhythm.      Pulses: Pulses are weak and thready     Heart sounds: Normal heart sounds.   Pulmonary:      Effort: Pulmonary effort is normal. No respiratory distress.      Breath sounds: No wheezing.      Comments: Diminished lung sounds, nasal cannula 2 L for comfort, respiratory rate 10-14 irregular periods of apnea  Abdominal:      General: Abdomen is flat. There is no distension.      Palpations: Abdomen is soft.   Musculoskeletal:      Right lower leg: No edema.      Left lower leg: No edema.   Skin:     General: Skin is warm and dry.   Extremities warm to touch without mottling, pulses are weak and thready  Neurological:      Comments: Pt responsive only to tactile stimuli     Last Recorded Vitals  Blood pressure 76/53, pulse 68, temperature 36.7 °C (98.1 °F), temperature source Axillary, resp. rate 10, height 1.829 m (6'), weight 63 kg (138 lb 14.2 oz), SpO2 92%.  Intake/Output last 3 Shifts:  I/O last 3 completed shifts:  In: 1940 (30.8 mL/kg) [I.V.:50 (0.8 mL/kg); IV Piggyback:1890]  Out: - (0 mL/kg)   Weight: 63 kg     Relevant Results  No results found for  this or any previous visit (from the past 24 hours).       Assessment/Plan   IMP:    Acute on chronic resp failure with hypoxia - on nasal canula, no escalation of care, comfort measures only  Toxic metabolic encephalopathy  Leukocytosis/suspected UTI  Elevated troponin  COPD  Dimitrios - as above, no future lab draws, no dialysis     Palliative Care Encounter  DNR comfort measures only  Pt is not capable, he is unresponsive  Spouse Libertad is HPOA. Documents are in Epic. She was present at the bedside today along with two other family/friends.    1/13  Met wife and both daughters at bedside, patient appears comfortable on morphine drip.  We discussed inpatient hospice versus [facility] [place], given his level of comfort and his unstable vital signs I discussed with the hospice nurse inpatient hospice at this time.  Orders placed, hospice nurse to contact registration and flipped patient to inpatient hospice once consents are signed.    1/12  Confirmed goals of care with spouse that are comfort measures only. No interest in heroic efforts or escalation of care. They would like hospice involved for family support. We will cont to follow for symptoms management.   D/w Care coordinator and referral sent to [facility]. They will meet tomorrow at bedside [phone] and transition patient to inpatient hospice.      I spent 35 minutes in the professional and overall care of this patient.      Skye Winchester, APRN-CNP

## 2025-01-13 NOTE — PROGRESS NOTES
01/13/25 0848   [facility] Planning   [facility]     Hospice meeting set up to meet with family 0930-1000am today  [facility] will follow for discharge needs.     UPDATE 1009: patient now GIP

## 2025-01-13 NOTE — PROGRESS NOTES
Karl Lopez is a 69 y.o. male on day 0 of admission presenting with No Principal Problem: There is no principal problem currently on the Problem List. Please update the Problem List and refresh..      Subjective   Patient lying in bed unresponsive.  Palliative care following. DNR CC , morphine drip infusing.       Objective     Last Recorded Vitals  There were no vitals taken for this visit.  Intake/Output last 3 Shifts:  No intake or output data in the 24 hours ending 01/13/25 1415      Admission Weight       Daily Weight  01/11/25 : 63 kg (138 lb 14.2 oz)          Physical Exam  Vitals reviewed.   Constitutional:       Appearance: He is ill-appearing.   HENT:      Head: Normocephalic.      Nose: Nose normal.      Mouth/Throat:      Mouth: Mucous membranes are dry.   Cardiovascular:      Rate and Rhythm: Regular rhythm.   Pulmonary:      Effort: Pulmonary effort is normal.   Abdominal:      General: Bowel sounds are normal.   Skin:     General: Skin is warm.         Assessment/Plan     This is a 69-year-old male with past medical history of CHF, COPD, pneumonia, chronic respiratory failure, depression, HTN, chronic pain syndrome, memory loss, hypokalemia, hematuria, frequent falls, BPH and other comorbidities presented to the ED by EMS from SNF due to concerns of respiratory distress and worsening AMS.  Unable to get history from the patient, wife present who mentioned that patient typically is AMS at baseline but slightly worsened in the last day.  He does have cognitive decline and dementia with memory loss.  He was recently discharged on 1/3/2025 for frequent falls and unable to take care of himself at home, he was transferred to SNF on discharge.  Per ED note, EMS had reported that patient had been diaphoretic and unresponsive to verbal or painful stimuli on encounter.  Patient had outpatient blood work done today showing leukocytosis 23,000.  X-ray performed prior to ED admission showed no evidence of  pneumonia.     Assessment & Plan  Acute on chronic respiratory failure (Multi)  Presented with respiratory distress, tachypnea and tachycardia, altered mental status  After admitting doctor spoke to the wife, the patient was made DNR comfort care  Comfort care orders  placed with IV morphine drip  Oxygen as needed for comfort  Hospice consult done, signed with hospice  Inpatient hospice        Plan  Continue with comfort measures, Morphine gtt  Family at bedside, discussed plan of care  DNRCC      Gabriele Rodriguez, APRN-CNP

## 2025-01-13 NOTE — NURSING NOTE
Assume care of pt. Eyes closed, respirations even & unlabored. Morphine drip drunnig at 2mg/hr. Family at bedside.

## 2025-01-13 NOTE — CARE PLAN
The patient's goals for the shift include maintain adequate pain control    The clinical goals for the shift include comfort and safety    Over the shift, the patient did not make progress toward the following goals. Barriers to progression include pain. Recommendations to address these barriers include manage pain.

## 2025-01-13 NOTE — PROGRESS NOTES
Name: Karl Lopez    YOB: 1955    Code Status: DNRcc-arrest    Chief Complaint:  Initial visit; new patient;  Leukocytosis; etc.....       HPI   69 year old male with medical history of BPH, HTN, spinal stenosis, cognitive impairment, progressive supranuclear palsy.    HOSPITAL COURSE:  Patient presented to [facility] on 1/2/25 with frequent falls.   It was reported that he has had 4 falls in the last 2 days.   Most of the time the falls are mechanical due to slipping or tripping on things and   generalized weakness but does endorse some intermittent dizziness.   States he has had progressive weakness ongoing for the last several years.  He lives at home with his wife and she is having difficulty caring for him.  Denies recent illness and sick contacts.   Denies fever, chills, SOB, CP, abdominal pain, nausea/vomiting, dysuria, difficulty urinating, constipation, and diarrhea.   Denies tobacco use and illicit substance use.   Reports drinking 1 beer a night.   Denies hx of alcohol withdrawal.   In the ED his vital signs were stable.  Labs showing K 3.2, ALT 20, AST 43, otherwise wnl.   UA noninfectious.   CT head/cervical spine/lumbar spine performed showed chronic degenerative changes.      Patient was admitted to [facility] from 1/2/25 to 1/3/25 (31 hours)    Patient admitted to [facility] on 1/3/25 for skilled services.    Hospital and chronic diagnoses as follows:    Leukocytosis:  1/7/25 WBC 14.73 H  1/9/25 WBC 16.57 H  On Rocephin.  ON 1/3/25 in the hospital prior to admission WBC 8.8 wnl  UA C & S ordered, results pending.  No fevers reported.  Patient seen today.  He is in bed.  Calm, cooperative.  No complaints of chest pain.  No headaches or dizziness.  No cough reported.    Hypernatremia:  1/7/25 Na 145 H  1/9/25 Na 149 H   Has oral fluids 300 ml q shift ordered.    Hypokalemia:  Treated with potassium chloride  Recent potassium levels:  1/7/25 K 3.5 L  1/9/25 K 4.7  (improved)    Elevated liver enzymes:  On 1/7/25 ALT 90 H;  H; alkaline phosphatase 126  On 1/9/25 ALT 63 H; AST (unavailable); alkaline phosphatase 110  Liver ultrasound ordered--results pending.    Frequent Falls:  Evaluated and treated in the hospital.  Suspected mechanical due to chronic pain and debility.  CT head/cervical spine/lumbar spine performed showed chronic degenerative changes.   PT/OT evaluated patient in the hospital, SNF recommended.  No falls reported since he admitted to [facility].    Primary hypertension:  On amlodipine, metoprolol and ramipril.  Recent BP: 121/64  No complaints of headaches or dizziness.  No chest pain.    Other chronic pain:  On gabapentin and percocet PRN.  No complaints of pain today.    BPH (benign prostatic hyperplasia)  On finasteride.  No complaints of dysuria.    Major depressive disorder; Cognitive impairment  On sertraline.    Progressive supranuclear palsy:  Has seen  neurology in the past.    Impaired gait and mobility; generalized muscle weakness:  Patient is at [facility] for skilled services.  Had falls at home.  No falls reported since he admitted.                  Reviewed [facility] records from recent hospitalization.  Reviewed  EMR  Reviewed medical, social, surgical and family history.  Reviewed all current medications and performed medication reconciliation.  Performed prescription drug management.  Reviewed vital signs AND lab results  Reviewed [facility] Documentation  Discussed patient with nursing and in house NP.  Spent greater than 50 minutes on patient visit.                 ROS: 10 point ROS performed; Negative unless noted in HPI.    MEDICAL HISTORY:  BPH  HTN  spinal stenosis  cognitive impairment  progressive supranuclear palsy.    SURGICAL HISTORY:  No surgical history.    SOCIAL HISTORY:  Former smoker.  No alcohol use.  No illicit drug use    FAMILY HISTORY:  Not available.    ALLERGIES:  Latex  Talwin  "compound  Nsaids (non-steroidal anti-inflammatory drug)  Pentazocine       LABS:  BMP: Date: 1/7/2025 Na 145 K 3.5 Cr 1.07 BUN 44 glucose 91 Ca 9.2 GFR 75  CBC: Date: 1/7/2025 WBC 14.73 Hgb 16.7 hematocrit 50.1 platelets 234  Liver function: Date: 1/7/2025 ALT 90  alkaline phos.  126 bilirubin 0.8 albumin 3.8 protein 7.1    BMP: Date: 1/9/2025 Na 149 K 4.7 Cr 0.92 BUN 34 glucose 75 Ca 9.2 GFR 90  CBC: Date: 1/9/2025 WBC 16.57 Hgb 16.8 hematocrit 50.6 platelets 302  Liver function: Date: 1/9/2025 ALT 63 AST (Not available)  alkaline phos.  110 bilirubin 0.7 albumin 3.4 protein 7.2        /64   Pulse 77   Temp 36.2 °C (97.1 °F)   Resp 16   Ht 1.753 m (5' 9\")   Wt 81.6 kg (180 lb)   SpO2 97%   BMI 26.58 kg/m²      Physical Exam  Vitals and nursing note reviewed.   Constitutional:       General: He is awake.      Appearance: He is ill-appearing.   HENT:      Head: Normocephalic.      Right Ear: External ear normal.      Left Ear: External ear normal.      Nose: Nose normal.      Mouth/Throat:      Mouth: Mucous membranes are moist.      Pharynx: Oropharynx is clear.   Eyes:      Extraocular Movements: Extraocular movements intact.      Conjunctiva/sclera: Conjunctivae normal.      Pupils: Pupils are equal, round, and reactive to light.   Cardiovascular:      Rate and Rhythm: Normal rate and regular rhythm.      Pulses: Normal pulses.      Heart sounds: Normal heart sounds.   Pulmonary:      Effort: Pulmonary effort is normal.      Breath sounds: Normal breath sounds.   Abdominal:      General: Bowel sounds are normal.      Palpations: Abdomen is soft.   Musculoskeletal:         General: Normal range of motion.      Cervical back: Normal range of motion and neck supple.   Skin:     General: Skin is warm and dry.   Neurological:      General: No focal deficit present.      Mental Status: Mental status is at baseline. He is confused.      Sensory: Sensation is intact.      Motor: Weakness present.      " Coordination: Coordination abnormal.      Gait: Gait abnormal.      Comments: A x O x 1 pleasantly confused   Psychiatric:         Mood and Affect: Affect is inappropriate.         Behavior: Behavior is cooperative.         Cognition and Memory: Cognition is impaired.         Judgment: Judgment is inappropriate.          Assessment/Plan    CBC, CMP ordered Q Monday.    Leukocytosis:  Monitor WBC  Continue Rocephin.  UA C & S ordered, results pending-follow up when available.    Hypernatremia:  Monitor sodium  Continue oral fluids 300 ml q shift ordered.  Staff to encourage PO fluids.  If not improved will need IVF.    Hypokalemia:  Improved.  Treated with potassium chloride  Monitor potassium level.    Elevated liver enzymes:  Monitor liver enzymes.  Follow up on liver ultrasound when available.    Frequent Falls:  Evaluated and treated in the hospital.  Suspected mechanical due to chronic pain and debility.  CT head/cervical spine/lumbar spine performed showed chronic degenerative changes.   Continue PT/OT at [facility].  Fall prevention strategies.    Primary hypertension:  Continue amlodipine, metoprolol and ramipril.  Monitor Bps.    Other chronic pain:  Continue gabapentin and percocet PRN.    BPH (benign prostatic hyperplasia):  Continue finasteride.  Monitor for dysuria.    Major depressive disorder; Cognitive impairment  Continue sertraline.    Progressive supranuclear palsy:  Has seen  neurology in the past.  Follow up with  neurology.    Impaired gait and mobility; generalized muscle weakness:  Patient is at [facility] for skilled services.  Fall prevention strategies.       Problem List Items Addressed This Visit       Other chronic pain    Cognitive impairment    Recurrent major depressive disorder (CMS-HCC)    Hypokalemia    Frequent falls    BPH (benign prostatic hyperplasia)    Hypernatremia    Leukocytosis - Primary     Other Visit Diagnoses       Elevated liver enzymes        Benign  essential HTN        Progressive supranuclear palsies (Multi)        Impaired gait and mobility        Generalized muscle weakness                aSngeeta Mcleod, APRN-CNP

## 2025-01-13 NOTE — ASSESSMENT & PLAN NOTE
Presented with respiratory distress, tachypnea and tachycardia, altered mental status  After admitting doctor spoke to the wife, the patient was made DNR comfort care  Comfort care orders  placed with IV morphine drip  Oxygen as needed for comfort  Hospice consult done, signed with hospice  Inpatient hospice

## 2025-01-14 ENCOUNTER — DOCUMENTATION (OUTPATIENT)
Dept: RESEARCH | Age: 70
End: 2025-01-14
Payer: OTHER MISCELLANEOUS

## 2025-01-14 PROCEDURE — 2500000004 HC RX 250 GENERAL PHARMACY W/ HCPCS (ALT 636 FOR OP/ED): Performed by: NURSE PRACTITIONER

## 2025-01-14 PROCEDURE — 2500000005 HC RX 250 GENERAL PHARMACY W/O HCPCS: Performed by: NURSE PRACTITIONER

## 2025-01-14 PROCEDURE — 2500000001 HC RX 250 WO HCPCS SELF ADMINISTERED DRUGS (ALT 637 FOR MEDICARE OP): Performed by: NURSE PRACTITIONER

## 2025-01-14 PROCEDURE — 1250000001 HC HOSPICE SEMI-PRIVATE ROOM DAILY

## 2025-01-14 PROCEDURE — 99232 SBSQ HOSP IP/OBS MODERATE 35: CPT | Performed by: NURSE PRACTITIONER

## 2025-01-14 PROCEDURE — 99231 SBSQ HOSP IP/OBS SF/LOW 25: CPT | Performed by: NURSE PRACTITIONER

## 2025-01-14 RX ADMIN — Medication 2 MG/HR: at 06:04

## 2025-01-14 RX ADMIN — Medication 2 L/MIN: at 20:41

## 2025-01-14 RX ADMIN — Medication 5 ML: at 19:43

## 2025-01-14 ASSESSMENT — COGNITIVE AND FUNCTIONAL STATUS - GENERAL
WALKING IN HOSPITAL ROOM: TOTAL
DRESSING REGULAR UPPER BODY CLOTHING: TOTAL
EATING MEALS: TOTAL
MOBILITY SCORE: 6
CLIMB 3 TO 5 STEPS WITH RAILING: TOTAL
WALKING IN HOSPITAL ROOM: TOTAL
HELP NEEDED FOR BATHING: TOTAL
TOILETING: TOTAL
PERSONAL GROOMING: TOTAL
STANDING UP FROM CHAIR USING ARMS: TOTAL
TOILETING: TOTAL
MOVING FROM LYING ON BACK TO SITTING ON SIDE OF FLAT BED WITH BEDRAILS: TOTAL
DRESSING REGULAR UPPER BODY CLOTHING: TOTAL
DRESSING REGULAR LOWER BODY CLOTHING: TOTAL
DRESSING REGULAR LOWER BODY CLOTHING: TOTAL
HELP NEEDED FOR BATHING: TOTAL
EATING MEALS: TOTAL
DAILY ACTIVITIY SCORE: 6
MOVING FROM LYING ON BACK TO SITTING ON SIDE OF FLAT BED WITH BEDRAILS: TOTAL
MOVING TO AND FROM BED TO CHAIR: TOTAL
DAILY ACTIVITIY SCORE: 6
TURNING FROM BACK TO SIDE WHILE IN FLAT BAD: TOTAL
TURNING FROM BACK TO SIDE WHILE IN FLAT BAD: TOTAL
MOVING TO AND FROM BED TO CHAIR: TOTAL
CLIMB 3 TO 5 STEPS WITH RAILING: TOTAL
PERSONAL GROOMING: TOTAL
MOBILITY SCORE: 6
STANDING UP FROM CHAIR USING ARMS: TOTAL

## 2025-01-14 ASSESSMENT — RESPIRATORY DISTRESS OBSERVATION SCALE (RDOS)
PARADOXICAL BREATHING PATTERN: 2 - PRESENT
RESPIRATORY RATE PER MINUTE: 1 - 19-30 BREATHS
RESTLESS NONPURPOSEFUL MOVEMENTS: 1 - OCCASIONAL, SLIGHT MOVEMENTS
GRUNTING AT END OF EXPIRATION: 0 - NONE
INVOLUNTARY NASAL FLARING: 0 - NONE
ACCESSORY MUSCLE RISE IN CLAVICLE DURING INSPIRATION: 1 - SLIGHT RISE
RDOS TOTAL SCORE: 5
HEART RATE PER MINUTE: 0 - <90 BEATS
LOOK OF FEAR: 0 - NONE

## 2025-01-14 ASSESSMENT — PAIN SCALES - GENERAL: PAINLEVEL_OUTOF10: 0 - NO PAIN

## 2025-01-14 ASSESSMENT — PAIN SCALES - WONG BAKER: WONGBAKER_NUMERICALRESPONSE: HURTS LITTLE BIT

## 2025-01-14 NOTE — CARE PLAN
The patient's goals for the shift include  comfort      The clinical goals for the shift include  comfort    Over the shift, the patient did not make progress toward the following goals. Barriers to progression include . Recommendations to address these barriers include .

## 2025-01-14 NOTE — RESEARCH NOTES
Artificial Intelligence Monitoring in Nursing (AIMS Nursing) Study    Principle Investigator - Dr. Nnamdi Riggs  Research Coordinator - PAULINA Matamoros     Patient Name - Karl Lopez  Date - 1/14/2025 1:55 PM  Location - [facility]    Karl Lopez was approached by PAULINA Matamoros to talk about participating in the AIMS Nursing Study. Recommended to have [facility] nearby. Study protocol was followed and patient was given study contact information.     PAULINA Matamoros

## 2025-01-14 NOTE — ASSESSMENT & PLAN NOTE
Presented with respiratory distress, tachypnea and tachycardia, altered mental status  Comfort care orders  placed with IV morphine drip  Oxygen as needed for comfort  Hospice consult done, signed with hospice  Inpatient hospice

## 2025-01-14 NOTE — PROGRESS NOTES
Spiritual Care Visit  Spiritual Care Request    Reason for Visit:  Routine Visit: Introduction     Request Received From:       Focus of Care:  Visited With: Patient         Refer to :          Spiritual Care Assessment    Spiritual Assessment:       Family Spiritual Care Encounters  Family Coping: Accepting  Family Participation in Care: Consistently demonstrated  Family Support During Treatment: Consistently demonstrated  Caregiver-Patient Relationship: Not compromised              Care Provided:  Intended Effects: Establish rapport and connectedness, Build relationship of care and support, Journeying with someone in the grief process, Demonstrate caring and concern, Lessen someone's feelings of isolation  Methods: Encourage end of life review  Interventions: Identify supportive relationship(s)    Sense of Community and or Sikhism Affiliation:  Faith         Addressed Needs/Concerns and/or Sara Through:          Outcome:  Outcome of Spiritual Care Visit: Acceptance, Identifying spiritual/emotional issues, Trust in self/others/God, Comfort/healing presence, Identifying patient's strengths/source of hope, Support system identified     Advance Directives:         Spiritual Care Annotation    Annotation:  Annotation:  provided emotional and spiritual support for patient and family at end of life.  provided support at bedside consistent with the family values and nathaniel.  Patient's  was coming to the hospital to provided spiritual support for the patient and family.  will remain available for support as desired or requested.

## 2025-01-14 NOTE — PROGRESS NOTES
Karl Lopez is a 69 y.o. male on day 1 of admission presenting with No Principal Problem: There is no principal problem currently on the Problem List. Please update the Problem List and refresh..      Subjective   Patient lying in bed unresponsive.  Palliative care following. DNR CC , morphine drip infusing.       Objective     Last Recorded Vitals  BP (!) 58/40 (BP Location: Right arm, Patient Position: Lying)   Pulse 86   Temp 36.6 °C (97.9 °F) (Axillary)   Resp 10   SpO2 91%   Intake/Output last 3 Shifts:    Intake/Output Summary (Last 24 hours) at 1/14/2025 1606  Last data filed at 1/13/2025 1855  Gross per 24 hour   Intake --   Output 0 ml   Net 0 ml         Admission Weight       Daily Weight  01/11/25 : 63 kg (138 lb 14.2 oz)          Physical Exam  Vitals reviewed.   Constitutional:       Appearance: He is ill-appearing.   HENT:      Head: Normocephalic.      Nose: Nose normal.      Mouth/Throat:      Mouth: Mucous membranes are dry.   Cardiovascular:      Rate and Rhythm: Regular rhythm.   Pulmonary:      Effort: Pulmonary effort is normal.   Abdominal:      General: Bowel sounds are normal.   Skin:     General: Skin is warm.         Assessment/Plan     This is a 69-year-old male with past medical history of CHF, COPD, pneumonia, chronic respiratory failure, depression, HTN, chronic pain syndrome, memory loss, hypokalemia, hematuria, frequent falls, BPH and other comorbidities presented to the ED by EMS from SNF due to concerns of respiratory distress and worsening AMS.  Unable to get history from the patient, wife present who mentioned that patient typically is AMS at baseline but slightly worsened in the last day.  He does have cognitive decline and dementia with memory loss.  He was recently discharged on 1/3/2025 for frequent falls and unable to take care of himself at home, he was transferred to SNF on discharge.  Per ED note, EMS had reported that patient had been diaphoretic and  unresponsive to verbal or painful stimuli on encounter.  Patient had outpatient blood work done today showing leukocytosis 23,000.  X-ray performed prior to ED admission showed no evidence of pneumonia.     Assessment & Plan  Acute on chronic respiratory failure (Multi)  Presented with respiratory distress, tachypnea and tachycardia, altered mental status  Comfort care orders  placed with IV morphine drip  Oxygen as needed for comfort  Hospice consult done, signed with hospice  Inpatient hospice        Plan  Continue with comfort measures, Morphine gtt  Family at bedside  DNRCC      Gabriele Rodriguez, APRN-CNP

## 2025-01-15 VITALS
DIASTOLIC BLOOD PRESSURE: 40 MMHG | SYSTOLIC BLOOD PRESSURE: 58 MMHG | OXYGEN SATURATION: 91 % | TEMPERATURE: 97.9 F | RESPIRATION RATE: 10 BRPM | HEART RATE: 86 BPM

## 2025-01-15 PROCEDURE — 99239 HOSP IP/OBS DSCHRG MGMT >30: CPT | Performed by: NURSE PRACTITIONER

## 2025-01-15 PROCEDURE — 2500000004 HC RX 250 GENERAL PHARMACY W/ HCPCS (ALT 636 FOR OP/ED): Performed by: NURSE PRACTITIONER

## 2025-01-15 PROCEDURE — 2500000005 HC RX 250 GENERAL PHARMACY W/O HCPCS: Performed by: NURSE PRACTITIONER

## 2025-01-15 RX ADMIN — Medication 2 L/MIN: at 08:49

## 2025-01-15 RX ADMIN — Medication 3 MG/HR: at 00:10

## 2025-01-15 RX ADMIN — Medication 3 MG/HR: at 07:20

## 2025-01-15 ASSESSMENT — RESPIRATORY DISTRESS OBSERVATION SCALE (RDOS)
ACCESSORY MUSCLE RISE IN CLAVICLE DURING INSPIRATION: 0 - NONE
RESPIRATORY RATE PER MINUTE: 0 - <19 BREATHS
PARADOXICAL BREATHING PATTERN: 2 - PRESENT
RDOS TOTAL SCORE: 2
LOOK OF FEAR: 0 - NONE
HEART RATE PER MINUTE: 0 - <90 BEATS
RESTLESS NONPURPOSEFUL MOVEMENTS: 0 - NONE
GRUNTING AT END OF EXPIRATION: 0 - NONE
INVOLUNTARY NASAL FLARING: 0 - NONE

## 2025-01-15 NOTE — NURSING NOTE
Repositioned for comfort, oxygen remains at 2L NC, morphine drip at 3 mg/hr, mouth care provided, family at bedside

## 2025-01-15 NOTE — NURSING NOTE
Bedside shift report, appears comfortable, no apparent distress, morphine remains at 3 mg/hr,  some right hand swelling noted, elevated on pillow, repositioned, mouth care provided, cool wash cloth placed on forehead, vital signs taken, per family request

## 2025-01-15 NOTE — NURSING NOTE
Am rounds, bedside shift report, assumed care.  Resting comfortably, morphine drip continuous at 2 mg/hr, family at bedside

## 2025-01-15 NOTE — DISCHARGE SUMMARY
Discharge Diagnosis  COPD, respiratory failure, UTI, WINIFRED, Toxic metabolic encephalopathy     Issues Requiring Follow-Up  none    Discharge Meds     Medication List      ASK your doctor about these medications     amLODIPine 10 mg tablet; Commonly known as: Norvasc; Take 1 tablet (10   mg) by mouth once daily.   ascorbic acid 500 mg tablet; Commonly known as: Vitamin C   cholecalciferol 25 MCG (1000 UT) tablet; Commonly known as: Vitamin D-3   finasteride 5 mg tablet; Commonly known as: Proscar; Take 1 tablet (5   mg) by mouth once daily. Do not crush, chew, or split.   gabapentin 100 mg capsule; Commonly known as: Neurontin   hydrOXYzine HCL 10 mg tablet; Commonly known as: Atarax; Take 1 tablet   (10 mg) by mouth every 8 hours if needed for anxiety.   metoprolol succinate  mg 24 hr tablet; Commonly known as:   Toprol-XL; Take 1 tablet (100 mg) by mouth 2 times a day.   oxyCODONE-acetaminophen  mg tablet; Commonly known as: Percocet   ramipril 10 mg capsule; Commonly known as: Altace; Take 2 capsules (20   mg) by mouth once daily.   sertraline 50 mg tablet; Commonly known as: Zoloft; Take 2 tablets (100   mg) by mouth once daily.   sildenafil 100 mg tablet; Commonly known as: Viagra; Take 1 tablet (100   mg) by mouth if needed for erectile dysfunction.       Test Results Pending At Discharge  Pending Labs       No current pending labs.            Hospital Course   This is a 69-year-old male with past medical history of CHF, COPD, pneumonia, chronic respiratory failure, depression, HTN, chronic pain syndrome, memory loss, hypokalemia, hematuria, frequent falls, BPH and other comorbidities presented to the ED by EMS from SNF due to concerns of respiratory distress and worsening AMS.  Unable to get history from the patient, wife present who mentioned that patient typically is AMS at baseline but slightly worsened in the last day.  He does have cognitive decline and dementia with memory loss.  He was  recently discharged on 1/3/2025 for frequent falls and unable to take care of himself at home, he was transferred to SNF on discharge.  Per ED note, EMS had reported that patient had been diaphoretic and unresponsive to verbal or painful stimuli on encounter.  Patient had outpatient blood work done today showing leukocytosis 23,000.  X-ray performed prior to ED admission showed no evidence of pneumonia.     In the ED on admission vitals notable for , RR 40, /94, SpO2 89% on 6 L nasal cannula.  Patient was put on 40 L 100% oxygen in the ED.  Labs notable for glucose 157, sodium 163, chloride 124, BUN 73, creatinine 3.73, GFR 17, bilirubin 1.3, lactate 4.8, troponin 455.  WBC 25.4, hemoglobin 19.2, hematocrit 59.6,     Influenza A, influenza B, RSV and COVID all negative     Urinalysis with pyuria versus UTI.     EKG sinus tachycardia 132 bpm, normal axis, normal voltage, normal ST segment and normal T wave, some mild limitation by motion artifact.     Chest x-ray:  No evidence of acute intrathoracic abnormality. Low lung volumes      CODE STATUS was DNR/DNI CC arrest and no intubation, in the ED the wife changed CODE STATUS to DNR comfort care in the emergency department and will sign document.     Patient admitted for comfort care measures     Palliative care was consulted, hospice was consulted. Patient was placed on Morphine gtt. Patient was declared dead this morning at 840 AM. Family at bedside.   Case discussed with attending provider    I spent over 35 minutes in professional and overall care of this patient.     Pertinent Physical Exam At Time of Discharge  Physical Exam  Cardiovascular:      Comments: No heart beat  Pulmonary:      Comments: Not breathing   Neurological:      Comments: Unresponsive          Outpatient Follow-Up  Future Appointments   Date Time Provider Department Center   2/12/2025  2:00 PM Nnamdi Serrano MD [id number]         Gabriele Rodriguez, APRN-CNP

## 2025-01-15 NOTE — SIGNIFICANT EVENT
Patient was receiving hospice care on a morphine drip.  He was noticed by the nurse around 840 to be .    I examined him.  He is completely unresponsive to any stimuli.  There is no heart sounds.  There is no breath sounds.  There is no visible breathing.    I am declaring him dead as of 8:40 AM.

## 2025-01-15 NOTE — NURSING NOTE
Patient  at 0840, family was at bedside and notified RN that patient had stopped breathing. MD Kennedy notified and came to bedside to pronounce time of death.  Patient prepped and transported to [facility] by security, pt still wearing gold wedding band on left hand, RN was unable to remove ring.

## 2025-01-15 NOTE — PROGRESS NOTES
Karl Lopez is a 69 y.o. male on day 1 of admission presenting with No Principal Problem: There is no principal problem currently on the Problem List. Please update the Problem List and refresh..    Subjective   Symptoms (0 - 10, Best to Worst)  [place] Symptom Assessment System  0-10 (Numeric) Pain Score: 0 - No pain  Resting comfortably in bed on morphine drip, blood pressure low, respiratory rate 10-14 with periods of apnea, irregular, no grimacing.  Family at bedside reports facial grimacing during incontinence care earlier otherwise unresponsive. Mottling today       Objective     Vitals and nursing note reviewed.   Constitutional:       General: He is not in acute distress.     Appearance: He is ill-appearing.      Comments: Thin and frail appearing elderly male   HENT:      Mouth/Throat:      Mouth: Mucous membranes are dry.      Pharynx: Oropharynx is clear.   Cardiovascular:      Rate and Rhythm: Normal rate and regular rhythm.      Pulses: Pulses are weak and thready     Heart sounds: Normal heart sounds.   Pulmonary:      Effort: Pulmonary effort is normal. No respiratory distress.      Breath sounds: No wheezing.      Comments: Diminished lung sounds, nasal cannula 2 L for comfort, respiratory rate 10-14 irregular periods of apnea  Abdominal:      General: Abdomen is flat. There is no distension.      Palpations: Abdomen is soft.   Musculoskeletal:      Right lower leg: No edema.      Left lower leg: No edema.   Skin:     General: Skin is warm and dry.   Extremities warm to touch without mottling, pulses are weak and thready  Neurological:      Comments: Pt responsive only to tactile stimuli     Last Recorded Vitals  Blood pressure (!) 58/40, pulse 86, temperature 36.6 °C (97.9 °F), temperature source Axillary, resp. rate 10, SpO2 91%.  Intake/Output last 3 Shifts:  No intake/output data recorded.    Relevant Results  No results found for this or any previous visit (from the past 24 hours).        Assessment/Plan   IMP:    Acute on chronic resp failure with hypoxia - on nasal canula, no escalation of care, comfort measures only  Toxic metabolic encephalopathy  Leukocytosis/suspected UTI  Elevated troponin  COPD  Dimitrios - as above, no future lab draws, no dialysis     Palliative Care Encounter  DNR comfort measures only  Pt is not capable, he is unresponsive  Spouse Libertad is HPOA. Documents are in Epic. She was present at the bedside today along with two other family/friends.    1/14  Morphine drip increased today for tachycardia, signs of discomfort. Mottling present, further respiratory changes. Continue in comfort care mode. Hospice following. Updated family at bedside.     1/13  Met wife and both daughters at bedside, patient appears comfortable on morphine drip.  We discussed inpatient hospice versus [facility], given his level of comfort and his unstable vital signs I discussed with the hospice nurse inpatient hospice at this time.  Orders placed, hospice nurse to contact registration and flipped patient to inpatient hospice once consents are signed.    1/12  Confirmed goals of care with spouse that are comfort measures only. No interest in heroic efforts or escalation of care. They would like hospice involved for family support. We will cont to follow for symptoms management.   D/w Care coordinator and referral sent to R. They will meet tomorrow at bedside [id number] and transition patient to inpatient hospice.      I spent 35 minutes in the professional and overall care of this patient.      Skye Winchester, APRN-CNP

## 2025-01-15 NOTE — CARE PLAN
The patient's goals for the shift include      The clinical goals for the shift include comfort and safety    Over the shift, the patient did not make progress toward the following goals. Barriers to progression include  . Recommendations to address these barriers include  .

## 2025-01-15 NOTE — NURSING NOTE
Morphine drip increased to 3 mg/hr d/t increased respiratory effort and family request (felt pt looked uncomfortable and noticed a change with his breathing)

## 2025-01-16 LAB
BACTERIA BLD CULT: NORMAL
BACTERIA BLD CULT: NORMAL

## 2025-02-12 ENCOUNTER — APPOINTMENT (OUTPATIENT)
Dept: NEUROLOGY | Facility: CLINIC | Age: 70
End: 2025-02-12
Payer: OTHER MISCELLANEOUS